# Patient Record
Sex: MALE | Race: WHITE
[De-identification: names, ages, dates, MRNs, and addresses within clinical notes are randomized per-mention and may not be internally consistent; named-entity substitution may affect disease eponyms.]

---

## 2018-03-12 ENCOUNTER — HOSPITAL ENCOUNTER (INPATIENT)
Dept: HOSPITAL 63 - ER | Age: 63
LOS: 1 days | Discharge: ANOTHER HOSPITAL | DRG: 308 | End: 2018-03-13
Attending: INTERNAL MEDICINE | Admitting: INTERNAL MEDICINE
Payer: COMMERCIAL

## 2018-03-12 VITALS — WEIGHT: 271.44 LBS | HEIGHT: 76 IN | BODY MASS INDEX: 33.05 KG/M2

## 2018-03-12 VITALS — SYSTOLIC BLOOD PRESSURE: 113 MMHG | DIASTOLIC BLOOD PRESSURE: 87 MMHG

## 2018-03-12 DIAGNOSIS — I50.23: ICD-10-CM

## 2018-03-12 DIAGNOSIS — Z79.01: ICD-10-CM

## 2018-03-12 DIAGNOSIS — Z87.891: ICD-10-CM

## 2018-03-12 DIAGNOSIS — Z23: ICD-10-CM

## 2018-03-12 DIAGNOSIS — Z79.899: ICD-10-CM

## 2018-03-12 DIAGNOSIS — Z88.0: ICD-10-CM

## 2018-03-12 DIAGNOSIS — I08.1: ICD-10-CM

## 2018-03-12 DIAGNOSIS — I42.9: ICD-10-CM

## 2018-03-12 DIAGNOSIS — I48.91: Primary | ICD-10-CM

## 2018-03-12 DIAGNOSIS — Z82.49: ICD-10-CM

## 2018-03-12 LAB
ALBUMIN SERPL-MCNC: 3.6 G/DL (ref 3.4–5)
ALBUMIN/GLOB SERPL: 1.1 {RATIO} (ref 1–1.7)
ALP SERPL-CCNC: 71 U/L (ref 46–116)
ALT SERPL-CCNC: 52 U/L (ref 16–63)
AMPHETAMINE/METHAMPHETAMINE: (no result)
ANION GAP SERPL CALC-SCNC: 10 MMOL/L (ref 6–14)
APTT PPP: YELLOW S
AST SERPL-CCNC: 32 U/L (ref 15–37)
BACTERIA #/AREA URNS HPF: 0 /HPF
BARBITURATES UR-MCNC: (no result) UG/ML
BASOPHILS # BLD AUTO: 0 X10^3/UL (ref 0–0.2)
BASOPHILS NFR BLD: 1 % (ref 0–3)
BENZODIAZ UR-MCNC: (no result) UG/L
BILIRUB SERPL-MCNC: 1 MG/DL (ref 0.2–1)
BILIRUB UR QL STRIP: (no result)
BUN/CREAT SERPL: 18 (ref 6–20)
CA-I SERPL ISE-MCNC: 16 MG/DL (ref 8–26)
CALCIUM SERPL-MCNC: 8.7 MG/DL (ref 8.5–10.1)
CANNABINOIDS UR-MCNC: (no result) UG/L
CHLORIDE SERPL-SCNC: 105 MMOL/L (ref 98–107)
CO2 SERPL-SCNC: 25 MMOL/L (ref 21–32)
COCAINE UR-MCNC: (no result) NG/ML
CREAT SERPL-MCNC: 0.9 MG/DL (ref 0.7–1.3)
EOSINOPHIL NFR BLD: 0.1 X10^3/UL (ref 0–0.7)
EOSINOPHIL NFR BLD: 2 % (ref 0–3)
ERYTHROCYTE [DISTWIDTH] IN BLOOD BY AUTOMATED COUNT: 15.6 % (ref 11.5–14.5)
FIBRINOGEN PPP-MCNC: CLEAR MG/DL
GFR SERPLBLD BASED ON 1.73 SQ M-ARVRAT: 86.1 ML/MIN
GLOBULIN SER-MCNC: 3.2 G/DL (ref 2.2–3.8)
GLUCOSE SERPL-MCNC: 104 MG/DL (ref 70–99)
GLUCOSE UR STRIP-MCNC: (no result) MG/DL
HCT VFR BLD CALC: 43.1 % (ref 39–53)
HGB BLD-MCNC: 14.3 G/DL (ref 13–17.5)
LIPASE: 320 U/L (ref 73–393)
LYMPHOCYTES # BLD: 1.6 X10^3/UL (ref 1–4.8)
LYMPHOCYTES NFR BLD AUTO: 23 % (ref 24–48)
MCH RBC QN AUTO: 30 PG (ref 25–35)
MCHC RBC AUTO-ENTMCNC: 33 G/DL (ref 31–37)
MCV RBC AUTO: 90 FL (ref 79–100)
METHADONE SERPL-MCNC: (no result) NG/ML
MONO #: 0.7 X10^3/UL (ref 0–1.1)
MONOCYTES NFR BLD: 11 % (ref 0–9)
NEUT #: 4.4 X10^3UL (ref 1.8–7.7)
NEUTROPHILS NFR BLD AUTO: 64 % (ref 31–73)
NITRITE UR QL STRIP: (no result)
OPIATES UR-MCNC: (no result) NG/ML
PCP SERPL-MCNC: (no result) MG/DL
PLATELET # BLD AUTO: 163 X10^3/UL (ref 140–400)
POTASSIUM SERPL-SCNC: 4 MMOL/L (ref 3.5–5.1)
PROT SERPL-MCNC: 6.8 G/DL (ref 6.4–8.2)
RBC # BLD AUTO: 4.78 X10^6/UL (ref 4.3–5.7)
RBC #/AREA URNS HPF: (no result) /HPF (ref 0–2)
SODIUM SERPL-SCNC: 140 MMOL/L (ref 136–145)
SP GR UR STRIP: 1.02
SQUAMOUS #/AREA URNS LPF: (no result) /LPF
UROBILINOGEN UR-MCNC: 0.2 MG/DL
WBC # BLD AUTO: 6.8 X10^3/UL (ref 4–11)
WBC #/AREA URNS HPF: (no result) /HPF (ref 0–4)

## 2018-03-12 PROCEDURE — 85027 COMPLETE CBC AUTOMATED: CPT

## 2018-03-12 PROCEDURE — 90686 IIV4 VACC NO PRSV 0.5 ML IM: CPT

## 2018-03-12 PROCEDURE — 84443 ASSAY THYROID STIM HORMONE: CPT

## 2018-03-12 PROCEDURE — 80307 DRUG TEST PRSMV CHEM ANLYZR: CPT

## 2018-03-12 PROCEDURE — 85730 THROMBOPLASTIN TIME PARTIAL: CPT

## 2018-03-12 PROCEDURE — 85025 COMPLETE CBC W/AUTO DIFF WBC: CPT

## 2018-03-12 PROCEDURE — 87040 BLOOD CULTURE FOR BACTERIA: CPT

## 2018-03-12 PROCEDURE — 83690 ASSAY OF LIPASE: CPT

## 2018-03-12 PROCEDURE — 84484 ASSAY OF TROPONIN QUANT: CPT

## 2018-03-12 PROCEDURE — 82550 ASSAY OF CK (CPK): CPT

## 2018-03-12 PROCEDURE — 85379 FIBRIN DEGRADATION QUANT: CPT

## 2018-03-12 PROCEDURE — 85610 PROTHROMBIN TIME: CPT

## 2018-03-12 PROCEDURE — G0479 DRUG TEST PRESUMP NOT OPT: HCPCS

## 2018-03-12 PROCEDURE — 83735 ASSAY OF MAGNESIUM: CPT

## 2018-03-12 PROCEDURE — 80053 COMPREHEN METABOLIC PANEL: CPT

## 2018-03-12 PROCEDURE — 93306 TTE W/DOPPLER COMPLETE: CPT

## 2018-03-12 PROCEDURE — 93005 ELECTROCARDIOGRAM TRACING: CPT

## 2018-03-12 PROCEDURE — 36415 COLL VENOUS BLD VENIPUNCTURE: CPT

## 2018-03-12 PROCEDURE — 83605 ASSAY OF LACTIC ACID: CPT

## 2018-03-12 PROCEDURE — 71275 CT ANGIOGRAPHY CHEST: CPT

## 2018-03-12 PROCEDURE — 81001 URINALYSIS AUTO W/SCOPE: CPT

## 2018-03-12 PROCEDURE — 87641 MR-STAPH DNA AMP PROBE: CPT

## 2018-03-12 PROCEDURE — 80048 BASIC METABOLIC PNL TOTAL CA: CPT

## 2018-03-12 PROCEDURE — 80061 LIPID PANEL: CPT

## 2018-03-12 PROCEDURE — 83880 ASSAY OF NATRIURETIC PEPTIDE: CPT

## 2018-03-12 PROCEDURE — 71045 X-RAY EXAM CHEST 1 VIEW: CPT

## 2018-03-12 RX ADMIN — SODIUM CHLORIDE SCH MLS/HR: 0.9 INJECTION, SOLUTION INTRAVENOUS at 22:02

## 2018-03-12 NOTE — RAD
Chest CTA

 

History: Tachycardia, elevated d-dimer, shortness of air

 

Technique: After bolus of intravenous contrast, CT imaging was performed 

of the chest. Multiplanar reconstruction images to include MIP 

reconstruction images are submitted.  Exposure: One or more of the 

following individualized dose reduction techniques were utilized for this 

examination:  1. Automated exposure control  2. Adjustment of the mA 

and/or kV according to patient size  3. Use of iterative reconstruction 

technique.

 

Contrast: 75 cc Omnipaque 300 

 

Comparison: None

 

Findings: [ ] The heart is enlarged. There is prominent coronary 

calcification. There is small dependent right pleural effusion. There is 

no pneumothorax. There is small quantity of free fluid about the 

visualized liver. There is mild reflux of contrast into the hepatic veins.

There is no pericardial effusion. Accurate evaluation of more distal and 

smaller pulmonary arteries is limited on this exam as not as well 

opacified with contrast, also some motion degradation. No central 

pulmonary embolism is identified. There are somewhat enlarged right hilar 

nodes, largest about 1.1 cm short axis dimension. There is subcarinal node

up to about 1.4 cm short axis dimension, some other smaller mediastinal 

nodes present. There are left hilar nodes, largest about 0.9 cm short 

axis. There is nonspecific sclerotic lesion T4 vertebral body.

 

Impression:

1. Heart is enlarged and there is prominent coronary calcification. There 

is also small right pleural effusion and mild free fluid of the visualized

abdomen in the right upper quadrant. Left ventricular failure is a 

consideration.

2. No central pulmonary embolism is identified, limited evaluation of the 

smaller and more distal branches on this exam.

3. There is nonspecific mediastinal and hilar lymphadenopathy.

4. There is nonspecific sclerotic lesion T4 vertebral body, possibly bone 

island although other marrow replacing lesion not excludable.

 

Electronically signed by: Matthew Mccain MD (3/12/2018 7:47 PM) [facility]

## 2018-03-12 NOTE — EKG
[facility] [address], [place] [address]

Test Date:    2018               Test Time:    17:10:53

Pat Name:     CADEN CUNNINGHAM        Department:   

Patient ID:   [id number]           Room:          

Gender:       M                        Technician:   GILBERTO

:          1955               Requested By: ANIL HORTON

Order Number: [id number]            Reading MD:     

                                 Measurements

Intervals                              Axis          

Rate:         137                      P:            

IN:                                    QRS:          17

QRSD:         92                       T:            66

QT:           314                                    

QTc:          476                                    

                           Interpretive Statements

IRREGULAR RHYTHM, NO P-WAVE FOUND

OTHERWISE NORMAL ECG

RI6.01

No previous ECG available for comparison

## 2018-03-12 NOTE — PHYS DOC
General


Chief Complaint:  RAPID HEART RATE


Stated Complaint:  shortness of breath


Time Seen by MD:  17:03


Source:  patient, RN/MD


Exam Limitations:  no limitations


Problems:   (ANIL HORTON DO)


Time Seen by MD:  18:40


Problems:   (XOCHILT BOSWELL MD)


History of Present Illness


Initial Comments


62-year-old male sent downstairs from PCPs office for rapid heart rate and 

dyspnea.





Patient states that he's had dyspnea with exertion and at times at rest 

intermittently for the past month. He denies any actual chest pain diaphoresis 

nausea arm or neck symptoms. Patient denies any ongoing medical problems he 

takes no medications daily is a nonsmoker and nonuser of alcohol or illicit 

substances. Through the discussion patient does state that his father had 

atrial fibrillation he is familiar with rate control and anticoagulation.





Margaret Jackson called down to the emergency department and spoke to me, states 

that this 8-year-old male presented to her office complaining of shortness of 

breath for the past month. States that he denied any chest pain or syncope, his 

heart rate was found to range from 132-150 bpm oxygen saturation 97% on room 

air and blood pressure 90/60. She is getting together a full H&P and we'll send 

it down with the patient, she reports that he takes no medications daily.


Timing/Duration:  other (one month)


Severity:  moderate


Modifying Factors:  worse with movement, improves with rest


Associated Symptoms:  shortness of breath


 (ANIL HORTON DO)


Allergies:  


Coded Allergies:  


     No Known Drug Allergies (Unverified , 3/12/18)





Past Medical History


Medical History:  no pertinent history


Surgical History:  no surgical history


 (ANIL HORTON DO)





Social History


Smoker:  non-smoker


Alcohol:  none


Drugs:  none


 (ANIL HORTON DO)





Review of Systems


Constitutional:  denies chills, denies diaphoresis, denies fever, malaise


Respiratory:  denies cough, shortness of breath, denies wheezing


Cardiovascular:  denies chest pain, denies palpitations, denies syncope


Gastrointestinal:  denies abdominal pain, denies nausea, denies vomiting


Musculoskeletal:  denies back pain, denies joint swelling, denies muscle pain, 

denies neck pain


Psychiatric/Neurological:  denies headache, denies numbness, denies paresthesia


Hematologic/Lymphatic:  denies blood clots, denies easy bleeding, denies easy 

bruising (ANIL HORTON DO)





Physical Exam


General Appearance:  WD/WN, no apparent distress


Eyes:  bilateral eye normal inspection, bilateral eye PERRL, bilateral eye EOMI


Ear, Nose, Throat:  hearing grossly normal, normal ENT inspection, normal 

pharynx


Neck:  non-tender, supple


Respiratory:  normal breath sounds, no respiratory distress


Cardiovascular:  normal peripheral pulses, tachycardia, irregularly irregular


Gastrointestinal:  normal bowel sounds, soft


Back:  no CVA tenderness, no vertebral tenderness


Extremities:  normal range of motion, non-tender, normal inspection, no pedal 

edema, no calf tenderness


Neurologic/Psychiatric:  CNs II-XII nml as tested, no motor/sensory deficits, 

alert, normal mood/affect, oriented x 3


Skin:  normal color, warm/dry


 (ANIL HORTON DO)





Orders, Labs, Meds


EKG: Atrial fibrillation with rapid ventricular response 137 bpm interpreted by 

me





1814: Multiple studies are pending, patient signed out to Dr. Boswell at shift 

change. See his documentation for results and patient disposition.


 (ANIL HORTON DO)


Orders, Labs, Meds





 Laboratory Tests








Test


  3/12/18


17:45


 


White Blood Count


  6.8 x10^3/uL


(4.0-11.0)


 


Red Blood Count


  4.78 x10^6/uL


(4.30-5.70)


 


Hemoglobin


  14.3 g/dL


(13.0-17.5)


 


Hematocrit


  43.1 %


(39.0-53.0)


 


Mean Corpuscular Volume


  90 fL ()


 


 


Mean Corpuscular Hemoglobin 30 pg (25-35)  


 


Mean Corpuscular Hemoglobin


Concent 33 g/dL


(31-37)


 


Red Cell Distribution Width


  15.6 %


(11.5-14.5)  H


 


Platelet Count


  163 x10^3/uL


(140-400)


 


Neutrophils (%) (Auto) 64 % (31-73)  


 


Lymphocytes (%) (Auto) 23 % (24-48)  L


 


Monocytes (%) (Auto) 11 % (0-9)  H


 


Eosinophils (%) (Auto) 2 % (0-3)  


 


Basophils (%) (Auto) 1 % (0-3)  


 


Neutrophils # (Auto)


  4.4 x10^3uL


(1.8-7.7)


 


Lymphocytes # (Auto)


  1.6 x10^3/uL


(1.0-4.8)


 


Monocytes # (Auto)


  0.7 x10^3/uL


(0.0-1.1)


 


Eosinophils # (Auto)


  0.1 x10^3/uL


(0.0-0.7)


 


Basophils # (Auto)


  0.0 x10^3/uL


(0.0-0.2)


 


Sodium Level


  140 mmol/L


(136-145)


 


Potassium Level


  4.0 mmol/L


(3.5-5.1)


 


Chloride Level


  105 mmol/L


()


 


Carbon Dioxide Level


  25 mmol/L


(21-32)


 


Anion Gap 10 (6-14)  


 


Blood Urea Nitrogen


  16 mg/dL


(8-26)


 


Creatinine


  0.9 mg/dL


(0.7-1.3)


 


Estimated GFR


(Cockcroft-Gault) 86.1  


 


 


BUN/Creatinine Ratio 18 (6-20)  


 


Glucose Level


  104 mg/dL


(70-99)  H


 


Lactic Acid Level


  1.0 mmol/L


(0.4-2.0)


 


Calcium Level


  8.7 mg/dL


(8.5-10.1)


 


Total Bilirubin


  1.0 mg/dL


(0.2-1.0)


 


Aspartate Amino Transferase


(AST) 32 U/L (15-37)


 


 


Alanine Aminotransferase (ALT)


  52 U/L (16-63)


 


 


Alkaline Phosphatase


  71 U/L


()


 


Creatine Kinase


  145 U/L


()


 


Troponin I Quantitative


  0.025 ng/mL


(0-0.055)


 


NT-Pro-B-Type Natriuretic


Peptide 3527 pg/mL


(0-124)  H


 


Total Protein


  6.8 g/dL


(6.4-8.2)


 


Albumin


  3.6 g/dL


(3.4-5.0)


 


Albumin/Globulin Ratio 1.1 (1.0-1.7)  


 


Lipase


  320 U/L


()





 [facility] [address], [place] [address] [phone]


 


 IMAGING REPORT





 Signed





PATIENT: CADEN CUNNINGHAM ACCOUNT: [id number] MRN#: [id number]


: 1955 LOCATION: ER AGE: 62


SEX: M EXAM DT: 18 ACCESSION#: [id number]


STATUS: REG ER ORD. PHYSICIAN: ANIL HORTON DO 


REASON: sob


PROCEDURE: PORTABLE CHEST 1V





PORTABLE CHEST 1V-2 AP images were obtained.


 


History: SOA.


 


Comparison: None are available


 


Findings:


Cardiomediastinal silhouette is enlarged.


No focal airspace consolidation.


No pneumothorax identified.


No evidence of pleural effusion.


 


 


Impression:  Enlargement of the cardiac silhouette. No evidence of acute 


infiltrate. 


 


Electronically signed by: Mitchell Reyes MD (3/12/2018 5:34 PM) [facility]














DICTATED AND SIGNED BY:     MITCHELL REYES MD


DATE:     18





CC: SPEEDY KUMAR; ANIL HORTON  ~








Is a pleasant 8-year-old male with no major medical problems who presents with 

irregular heartbeat and shortness of breath. On initial presentation it looks 

like he is in A. fib and RVR with no history of PE or risk factors for PE he 

seconds several risk for PE because of untreated H her for evaluation. Patient 

is not tachypneic hypoxic but is very tachycardic. He was given digoxin prior 

to my arrival and I encouraged Dr. peoples to give him a dose of verapamil 10 mg 

IV. He is still tachycardic rate 110.





Time is now 7:30 patient's heart rate is improved significantly with the 

verapamil IV push over 2 minutes. His heart rate is now below 90. He is not 

short of breath and having chest pain but he did have an elevated d-dimer noted 

upon initial presentation of 2.28 which will require further definitive 

definitive testing because of his atrial fibrillation he is at increased risk 

for PE.








CT angios the chest return at approximately 8 PM demonstrates no specific 

finding for PE he does have evidence of possible early congestive heart failure 

with significant calcification of the aorta and a small pleural effusion.





At this point I discussed factors and admission with the patient at the 

bedside. Because of his new onset atrial fibrillation I will also treated with 

a dose of Lovenox to protect him from the development of clots. It looks like 

he is rate controlled with a heart rate less than 90 I will repeat an EKG and 

admitted to the hospital to see cardiology and repeat serial enzymes to ensure 

that he is not suffering from cardiac ischemia.





Consultant note: On-call physician Dr. Lassiter





Consultant called at of the service service called at approximately 8:02 PM


Consult called back at 8:02 PM





Discussed the case I presented and they agreed with admission. Time of 

acceptance 8:02 PM





"I have assessed this patient clinically and believe that their condition 

requires an admission to the hospital.  After consulting the admitting 

physician about this case, they have asked that I admit this patient to their 

service as an inpatient based on the clinical presentation and my impression."





I spent approximately 45-50 minutes working and engaged directly in the patient 

care providing critical care evaluation this includes but not limited to time 

spent engaged in work directly related to the individual patients care. I spent 

time at the bedside, reviewing test results, discussing the case with staff, 

documenting the medical record and time spent with EMS discussing specific 

treatment issues when the patient presented and during his evaluation.





Differential diagnosis: Acute myocardial ischemia, heart failure, cardiac 

tamponade, bronchospasm, pulmonary embolism, pneumothorax, pulmonary infection 

i.e. bronchitis or pneumonia, upper airway obstruction, anaphylaxis, aspiration

, psychogenic, pulmonary contusion, toxidrome, pneumomediastinum, 

noncardiogenic pulmonary edema or ARDS, COPD, tuberculosis, cystic fibrosis, 

asthma, high altitude pulmonary edema, valvular dysfunction, cardiac dysrhythmia

, stroke, neuromuscular diseases like myasthenia gravis gravis, ALS, Guillain-

Barr syndrome, metabolic acidosis to include diabetic ketoacidosis, sepsis, 

and obstructive disorders like massive obesity








Impression: Early congestive heart failure, dilated cardio myopathy, atrial 

fibrillation now rate controlled,





Disposition: Admission to the hospital as an inpatient


 (XOCHILT BOSWELL MD)











ANIL HORTON DO Mar 12, 2018 17:05


XOCHILT BOSWELL MD Mar 12, 2018 18:47

## 2018-03-12 NOTE — RAD
PORTABLE CHEST 1V-2 AP images were obtained.

 

History: SOA.

 

Comparison: None are available

 

Findings:

Cardiomediastinal silhouette is enlarged.

No focal airspace consolidation.

No pneumothorax identified.

No evidence of pleural effusion.

 

 

Impression:  Enlargement of the cardiac silhouette. No evidence of acute 

infiltrate. 

 

Electronically signed by: Mitchell Reyes MD (3/12/2018 5:34 PM) [facility]

## 2018-03-13 VITALS — SYSTOLIC BLOOD PRESSURE: 101 MMHG | DIASTOLIC BLOOD PRESSURE: 88 MMHG

## 2018-03-13 VITALS — SYSTOLIC BLOOD PRESSURE: 104 MMHG | DIASTOLIC BLOOD PRESSURE: 73 MMHG

## 2018-03-13 VITALS
DIASTOLIC BLOOD PRESSURE: 84 MMHG | DIASTOLIC BLOOD PRESSURE: 84 MMHG | SYSTOLIC BLOOD PRESSURE: 107 MMHG | SYSTOLIC BLOOD PRESSURE: 107 MMHG

## 2018-03-13 VITALS — SYSTOLIC BLOOD PRESSURE: 104 MMHG | DIASTOLIC BLOOD PRESSURE: 79 MMHG

## 2018-03-13 VITALS — DIASTOLIC BLOOD PRESSURE: 91 MMHG | SYSTOLIC BLOOD PRESSURE: 111 MMHG

## 2018-03-13 VITALS — DIASTOLIC BLOOD PRESSURE: 79 MMHG | SYSTOLIC BLOOD PRESSURE: 113 MMHG

## 2018-03-13 LAB
ANION GAP SERPL CALC-SCNC: 6 MMOL/L (ref 6–14)
CA-I SERPL ISE-MCNC: 14 MG/DL (ref 8–26)
CALCIUM SERPL-MCNC: 8.3 MG/DL (ref 8.5–10.1)
CHLORIDE SERPL-SCNC: 106 MMOL/L (ref 98–107)
CHOLEST SERPL-MCNC: 93 MG/DL (ref 0–200)
CHOLEST/HDLC SERPL: 3 {RATIO}
CO2 SERPL-SCNC: 27 MMOL/L (ref 21–32)
CREAT SERPL-MCNC: 1 MG/DL (ref 0.7–1.3)
ERYTHROCYTE [DISTWIDTH] IN BLOOD BY AUTOMATED COUNT: 15.5 % (ref 11.5–14.5)
GFR SERPLBLD BASED ON 1.73 SQ M-ARVRAT: 76.2 ML/MIN
GLUCOSE SERPL-MCNC: 98 MG/DL (ref 70–99)
HCT VFR BLD CALC: 41 % (ref 39–53)
HDLC SERPL-MCNC: 25 MG/DL (ref 40–60)
HGB BLD-MCNC: 13.5 G/DL (ref 13–17.5)
LDLC: 61 MG/DL (ref 0–100)
MAGNESIUM SERPL-MCNC: 1.8 MG/DL (ref 1.8–2.4)
MCH RBC QN AUTO: 30 PG (ref 25–35)
MCHC RBC AUTO-ENTMCNC: 33 G/DL (ref 31–37)
MCV RBC AUTO: 90 FL (ref 79–100)
PLATELET # BLD AUTO: 148 X10^3/UL (ref 140–400)
POTASSIUM SERPL-SCNC: 3.7 MMOL/L (ref 3.5–5.1)
RBC # BLD AUTO: 4.55 X10^6/UL (ref 4.3–5.7)
SODIUM SERPL-SCNC: 139 MMOL/L (ref 136–145)
THYROID STIM HORMONE (TSH): 2.83 UIU/ML (ref 0.36–3.74)
TRIGL SERPL-MCNC: 37 MG/DL (ref 0–150)
VLDLC: 7 MG/DL (ref 0–40)
WBC # BLD AUTO: 6.3 X10^3/UL (ref 4–11)

## 2018-03-13 RX ADMIN — SODIUM CHLORIDE SCH MLS/HR: 0.9 INJECTION, SOLUTION INTRAVENOUS at 08:37

## 2018-03-13 RX ADMIN — APIXABAN SCH MG: 5 TABLET, FILM COATED ORAL at 12:28

## 2018-03-13 RX ADMIN — APIXABAN SCH MG: 5 TABLET, FILM COATED ORAL at 10:06

## 2018-03-13 NOTE — CARD
MR#: [id number]

Account#: [id number]

Accession#: [id number]

Date of Study: 03/13/2018

Ordering Physician: JUANITA RANDHAWA, 

Referring Physician: SHAHLA FAGAN 

Tech: NABEEL Tovar





--------------- APPROVED REPORT --------------





EXAM: Two-dimensional and M-mode echocardiogram with Doppler and color Doppler.



INDICATION

Dyspnea 

Atrial Fibrillation



2D DIMENSIONS 

Left Atrium(2D)5.6 (1.6-4.0cm)IVSd1.2 (0.7-1.1cm)

Aortic Root(2D)3.0 (2.0-3.7cm)LVDd7.8 (3.9-5.9cm)

LVOT Diameter2.4 (1.8-2.4cm)PWd1.3 (0.7-1.1cm)

LVDs7.1 (2.5-4.0cm)FS (%) 4.0 %

SV6.0 ml



Aortic Valve

LVOT Peak Miguel.47.4cm/s



Tricuspid Valve

TR P. [id number]/sRAP [id number]

TR Peak Gr.[id number]



 LEFT VENTRICLE 

The Left Ventricle is moderately dilated. There is mild concentric left ventricular hypertrophy. The 
ejection fraction is severely impaired. The Ejection Fraction is 15-20%. There is severe global hypok
inesis of the left ventricle. No left ventricle thrombus noted on this study.



 RIGHT VENTRICLE 

The right ventricle is mildly dilated. The right ventricle is borderline hypertrophied. Systolic func
tion is mildly reduced.



 ATRIA 

The left atrium is moderately dilated. The right atrium is moderately dilated. The interatrial septum
 is intact with no evidence for an atrial septal defect or patent foramen ovale as noted on 2-D or Do
ppler imaging.



 AORTIC VALVE 

The aortic valve is trileaflet. Doppler and Color Flow revealed trace aortic regurgitation. There is 
no significant aortic valvular stenosis.



 MITRAL VALVE 

The mitral valve leaflets are mildly calcified. There is no mitral valve stenosis. Doppler and Color-
flow revealed moderate to moderate severe mitral regurgitation.



 TRICUSPID VALVE 

Doppler and Color Flow revealed moderate to moderately severe tricuspid regurgitation. There is moder
ate pulmonary hypertension. PASP is 51mmHg. There is no tricuspid valve stenosis. 



 PULMONIC VALVE 

The pulmonic valve is not well visualized. Doppler and Color Flow revealed no pulmonic valvular regur
gitation. There is no pulmonic valvular stenosis.



 GREAT VESSELS 

The aortic root is normal in size. The pulmonary artery and branches are dilated. The IVC is severley
 dilated. There is no collapse of the IVC with inspiration.



 PERICARDIAL EFFUSION 

There is no pleural effusion. There is no evidence of significant pericardial effusion.



Critical Notification

Physician Notified 

Critical Value: Yes



<Conclusion>

The Left Ventricle is moderately  dilated.

The ejection fraction is severely impaired.

The Ejection Fraction is 15-20%.

There is severe global hypokinesis of the left ventricle.

There is mild concentric left ventricular hypertrophy.

There is no significant aortic valvular stenosis.

Doppler and Color Flow revealed trace aortic regurgitation.

Doppler and Color-flow revealed moderate to moderate severe  mitral regurgitation.

Doppler and Color Flow revealed moderate to moderately severe tricuspid regurgitation.

There is moderate pulmonary hypertension.  PASP is 51mmHg.



Signed by : Wayne Britt MD

Electronically Approved : 03/13/2018 12:56:18

## 2018-03-13 NOTE — PDOC2
JUANITA RANDHAWA APRN 3/13/18 0936:


CONSULT


Date of Admission


DATE: 3/13/18 


TIME: 09:28


Reason for Consult:


atrial fibrillation with RVR


Problem List


Problems


Medical Problems:


(1) New onset atrial fibrillation


Status: Acute  








History of Present Illness


Mr Vasquez is a 62 year old male who presented to his PCP yesterday for 

evaluation of dyspnea on exertion that he has been experiencing for the last 

couple weeks.  He denies chest pain, congestive symptoms, edema, palpitations, 

lightheadedness or syncope.  He denies any cough, fever or chills.  He denies 

any recent travel or sick exposures.


Past Medical History


no significant medical history


Past Surgical History


no significant history


Family History


Father with atrial fibrillation


Social History


remote smoking history, no significant ETOh, no illicit drugs


Current Medications





Current Medications


Aspirin (Children'S Aspirin) 324 mg 1X  ONCE PO  Last administered on 3/12/18at 

17:33;  Start 3/12/18 at 17:30;  Stop 3/12/18 at 17:31;  Status DC


Sodium Chloride 1,000 ml @  1,000 mls/hr Q1H IV  Last administered on 3/12/18at 

17:43;  Start 3/12/18 at 17:05;  Stop 3/12/18 at 18:04;  Status DC


Digoxin (Lanoxin) 500 mcg 1X  ONCE IV  Last administered on 3/12/18at 17:49;  

Start 3/12/18 at 18:00;  Stop 3/12/18 at 18:03;  Status DC


Verapamil HCl (Verapamil HCl) 10 mg 1X  ONCE IV  Last administered on 3/12/18at 

18:59;  Start 3/12/18 at 18:30;  Stop 3/12/18 at 18:31;  Status DC


Verapamil HCl (Verapamil HCl) 10 mg 1X  ONCE IV ;  Start 3/12/18 at 19:00;  

Stop 3/12/18 at 19:01;  Status DC


Iohexol (Omnipaque 300 Mg/ml) 75 ml 1X  ONCE IV  Last administered on 3/12/18at 

19:19;  Start 3/12/18 at 19:15;  Stop 3/12/18 at 19:16;  Status DC


Info (Do NOT chart on this entry -- for MONITORING) 1 each PRN DAILY  PRN MC 

SEE COMMENTS;  Start 3/12/18 at 19:15;  Stop 3/14/18 at 19:14


Ondansetron HCl (Zofran) 4 mg PRN Q4HRS  PRN IV NAUSEA/VOMITING;  Start 3/12/18 

at 20:15;  Stop 3/13/18 at 20:14


Sodium Chloride 1,000 ml @  80 mls/hr [id number] IV  Last administered on 3/12/

18at 22:02;  Start 3/12/18 at 20:07;  Stop 3/13/18 at 20:06


Enoxaparin Sodium (Lovenox 120mg Syringe) 120 mg 1X  ONCE SQ  Last administered 

on 3/12/18at 22:01;  Start 3/12/18 at 20:15;  Stop 3/12/18 at 20:16;  Status DC


Influenza Virus Vaccine Quadrival (Fluarix Quad [id number] Syringe) 0.5 ml ONCE 

ONCE VAX IM  Last administered on 3/13/18at 09:01;  Start 3/13/18 at 09:00;  

Stop 3/13/18 at 09:01;  Status DC





Active Scripts


Active


Reported


No Known Medications Prior To Admisstion (Info)  Each 1 Each 


Allergies:  


Coded Allergies:  


     Penicillins (Verified  Allergy, Unknown, Unknown, 3/12/18)


Review of System


as per HPI or negative


General:  Alert, Oriented X3, Cooperative, No acute distress


HEENT:  Atraumatic, EOMI, Mucous membr. moist/pink


Lungs:  Clear to auscultation, Normal air movement


Heart:  Other (irregular rate and rhythm, no gallops, clicks or rubs, no 

significant murmurs)


Abdomen:  Normal bowel sounds, Soft, No tenderness


Extremities:  No cyanosis, No edema, Normal pulses


Neuro:  Normal speech, Strength at 5/5 X4 ext, Cranial nerves 3-12 NL


Psych/Mental Status:  Mental status NL, Mood NL


VITALS





Vital Signs








  Date Time  Temp Pulse Resp B/P (MAP) Pulse Ox O2 Delivery O2 Flow Rate FiO2


 


3/13/18 07:11  101 20 113/79 (90) 96 Room Air  


 


3/12/18 21:52 97.9       








Labs





Laboratory Tests








Test


  3/12/18


17:45 3/12/18


19:35 3/12/18


23:15 3/13/18


03:30


 


White Blood Count


  6.8 x10^3/uL


(4.0-11.0) 


  


  6.3 x10^3/uL


(4.0-11.0)


 


Red Blood Count


  4.78 x10^6/uL


(4.30-5.70) 


  


  4.55 x10^6/uL


(4.30-5.70)


 


Hemoglobin


  14.3 g/dL


(13.0-17.5) 


  


  13.5 g/dL


(13.0-17.5)


 


Hematocrit


  43.1 %


(39.0-53.0) 


  


  41.0 %


(39.0-53.0)


 


Mean Corpuscular Volume 90 fL ()    90 fL () 


 


Mean Corpuscular Hemoglobin 30 pg (25-35)    30 pg (25-35) 


 


Mean Corpuscular Hemoglobin


Concent 33 g/dL


(31-37) 


  


  33 g/dL


(31-37)


 


Red Cell Distribution Width


  15.6 %


(11.5-14.5) 


  


  15.5 %


(11.5-14.5)


 


Platelet Count


  163 x10^3/uL


(140-400) 


  


  148 x10^3/uL


(140-400)


 


Neutrophils (%) (Auto) 64 % (31-73)    


 


Lymphocytes (%) (Auto) 23 % (24-48)    


 


Monocytes (%) (Auto) 11 % (0-9)    


 


Eosinophils (%) (Auto) 2 % (0-3)    


 


Basophils (%) (Auto) 1 % (0-3)    


 


Neutrophils # (Auto)


  4.4 x10^3uL


(1.8-7.7) 


  


  


 


 


Lymphocytes # (Auto)


  1.6 x10^3/uL


(1.0-4.8) 


  


  


 


 


Monocytes # (Auto)


  0.7 x10^3/uL


(0.0-1.1) 


  


  


 


 


Eosinophils # (Auto)


  0.1 x10^3/uL


(0.0-0.7) 


  


  


 


 


Basophils # (Auto)


  0.0 x10^3/uL


(0.0-0.2) 


  


  


 


 


Prothrombin Time


  12.5 SEC


(9.4-11.4) 


  


  


 


 


Prothromb Time International


Ratio 1.2 (0.9-1.1) 


  


  


  


 


 


Activated Partial


Thromboplast Time 24 SEC (23-33) 


  


  


  


 


 


D-Dimer (Cristina)


  2.28 mg/L


(0.00-0.50) 


  


  


 


 


Sodium Level


  140 mmol/L


(136-145) 


  


  139 mmol/L


(136-145)


 


Potassium Level


  4.0 mmol/L


(3.5-5.1) 


  


  3.7 mmol/L


(3.5-5.1)


 


Chloride Level


  105 mmol/L


() 


  


  106 mmol/L


()


 


Carbon Dioxide Level


  25 mmol/L


(21-32) 


  


  27 mmol/L


(21-32)


 


Anion Gap 10 (6-14)    6 (6-14) 


 


Blood Urea Nitrogen


  16 mg/dL


(8-26) 


  


  14 mg/dL


(8-26)


 


Creatinine


  0.9 mg/dL


(0.7-1.3) 


  


  1.0 mg/dL


(0.7-1.3)


 


Estimated GFR


(Cockcroft-Gault) 86.1 


  


  


  76.2 


 


 


BUN/Creatinine Ratio 18 (6-20)    


 


Glucose Level


  104 mg/dL


(70-99) 


  


  98 mg/dL


(70-99)


 


Lactic Acid Level


  1.0 mmol/L


(0.4-2.0) 


  


  


 


 


Calcium Level


  8.7 mg/dL


(8.5-10.1) 


  


  8.3 mg/dL


(8.5-10.1)


 


Total Bilirubin


  1.0 mg/dL


(0.2-1.0) 


  


  


 


 


Aspartate Amino Transf


(AST/SGOT) 32 U/L (15-37) 


  


  


  


 


 


Alanine Aminotransferase


(ALT/SGPT) 52 U/L (16-63) 


  


  


  


 


 


Alkaline Phosphatase


  71 U/L


() 


  


  


 


 


Creatine Kinase


  145 U/L


() 


  


  


 


 


Troponin I Quantitative


  0.025 ng/mL


(0-0.055) 


  0.030 ng/mL


(0-0.055) 0.021 ng/mL


(0-0.055)


 


NT-Pro-B-Type Natriuretic


Peptide 3527 pg/mL


(0-124) 


  


  


 


 


Total Protein


  6.8 g/dL


(6.4-8.2) 


  


  


 


 


Albumin


  3.6 g/dL


(3.4-5.0) 


  


  


 


 


Albumin/Globulin Ratio 1.1 (1.0-1.7)    


 


Lipase


  320 U/L


() 


  


  


 


 


Urine Collection Type  Unknown   


 


Urine Color  Yellow   


 


Urine Clarity  Clear   


 


Urine pH  5.0   


 


Urine Specific Gravity  1.025   


 


Urine Protein


  


  30 mg/dl


(NEG-TRACE) 


  


 


 


Urine Glucose (UA)


  


  Neg mg/dL


(NEG) 


  


 


 


Urine Ketones (Stick)


  


  Neg mg/dL


(NEG) 


  


 


 


Urine Blood  Neg (NEG)   


 


Urine Nitrite  Neg (NEG)   


 


Urine Bilirubin  Neg (NEG)   


 


Urine Urobilinogen Dipstick


  


  0.2 mg/dL (0.2


mg/dL) 


  


 


 


Urine Leukocyte Esterase  Neg (NEG)   


 


Urine RBC  Occ /HPF (0-2)   


 


Urine WBC  Occ /HPF (0-4)   


 


Urine Squamous Epithelial


Cells 


  Few /LPF 


  


  


 


 


Urine Bacteria  0 /HPF (0-FEW)   


 


Urine Mucus  Mod /LPF   


 


Urine Opiates Screen  Neg (NEG)   


 


Urine Methadone Screen  Neg (NEG)   


 


Urine Barbiturates  Neg (NEG)   


 


Urine Phencyclidine Screen  Neg (NEG)   


 


Urine


Amphetamine/Methamphetamine 


  Neg (NEG) 


  


  


 


 


Urine Benzodiazepines Screen  Neg (NEG)   


 


Urine Cocaine Screen  Neg (NEG)   


 


Urine Cannabinoids Screen  Neg (NEG)   


 


Urine Ethyl Alcohol  Neg (NEG)   


 


Magnesium Level


  


  


  


  1.8 mg/dL


(1.8-2.4)








Images


CXR - Impression:  Enlargement of the cardiac silhouette. No evidence of acute 


infiltrate. 





CT -  


Impression:


1. Heart is enlarged and there is prominent coronary calcification. There 


is also small right pleural effusion and mild free fluid of the visualized


abdomen in the right upper quadrant. Left ventricular failure is a 


consideration.


2. No central pulmonary embolism is identified, limited evaluation of the 


smaller and more distal branches on this exam.


3. There is nonspecific mediastinal and hilar lymphadenopathy.


4. There is nonspecific sclerotic lesion T4 vertebral body, possibly bone 


island although other marrow replacing lesion not excludable.


Assessment/Plan


1.  atrial fibrillation with RVR - Rate controlled after IV verapamil and 

digoxin.   Add oral metoprolol to maintain heart rate control.  [id number] = 

0.  As unknown duration of atrial fibrillation, suggest anticoagulation for 4 

weeks, outpatient MCT and then follow up to discuss ECV vs Antiarrhythmic.  

Should be able to stop anticoag in favor of aspirin after that. 


2.  dyspnea on exertion - no overt heart failure.  likely secondary to #1.  

Check echo and if normal, outpatient MPI.  


3.  unknown lipid status - check lipids.


Problems:  





DOTTIE RAMOS MD 3/13/18 1629:


CONSULT


Allergies:  


Coded Allergies:  


     Penicillins (Verified  Allergy, Unknown, Unknown, 3/12/18)


Assessment/Plan


Patient seen and examined


1. Probable new onset of atrial fibrillation. Rate initially elevated but now 

under better control. Will continue beta blockers and calcium blockers if 

needed for rate control. Would anticoagulate. We'll check an echocardiogram. 


2. Increasing dyspnea on exertion. Patient reports several weeks of increasing 

dyspnea. This occurs with exertion but not at rest. Continue medical treatment 

and rate control as above. We'll check an echocardiogram. Further treatment 

based on clinical course and results of echocardiogram.


3. Uncertain lipid levels. Check a panel.


Thank you for allowing us to participate in the care of your patient.


Problems:  











JUANITA RANDHAWA Mar 13, 2018 09:36


DOTTIE RAMOS MD Mar 13, 2018 16:29

## 2018-03-13 NOTE — SSS
ADMIT DATE:  2018



HISTORY OF PRESENT ILLNESS:  The patient is a 62-year-old  male patient

who apparently saw his primary care physician with a complaint of shortness of

breath and was found to be in atrial fibrillation with rapid ventricular

response.  He stated that his dyspnea with exertion and at times at rest

evidently has been going on for the last month.  He denies any actual chest

pain, diaphoresis, nausea, arm or neck symptoms.  Denies any ongoing medical

problems.  He takes no medication.



SOCIAL HISTORY:  He is a nonsmoker, does not use any alcohol or use any illicit

drugs.



On arrival to the Emergency Room, he was found to be in atrial fibrillation with

rapid ventricular response with the heart rate ranged between 130-150 beats per

minute.  However, his oxygen saturation was 97% on room air.  Blood pressure was

borderline low at 90/60.



PAST MEDICAL HISTORY:  Unremarkable in that he has never been diagnosed with any

medical problem.  No hypertension, diabetes, or hyperlipidemia.



PAST SURGICAL HISTORY:  Unremarkable.



ALLERGIES:  He is allergic to PENICILLIN.



MEDICATIONS:  Only over-the-counter medication, multivitamin.



FAMILY HISTORY:  His father  at the age of 52 because of myocardial

infarction and he was known to have atrial fibrillation and was on

anticoagulant.  His mother is still alive at the age of 83 and basically

healthy.



SOCIAL HISTORY:  He is , has 5 kids, 2 of them are stepsons.  He quit

smoking 30 years ago.  Does not drink alcohol or use any recreational drugs.  He

works as a cook.



PHYSICAL EXAMINATION:

GENERAL:  On examining him, the patient looked well and was clearly in no

apparent respiratory distress, slightly pale, but not jaundiced or cyanosed from

thyromegaly.  No jugular venous distension.  No lower limb edema.

VITAL SIGNS:  His heart rate was 121, irregularly irregular, blood pressure

114/74, temperature was 97.6, respiratory rate was 18, and oxygen saturation was

95%.

HEAD, EYES, EARS, NOSE, AND THROAT:  Normocephalic, atraumatic.

NECK:  Supple.

HEART:  Showed normal first and second heart sounds with no gallop, rub, or

murmur.

CHEST:  Showed central trachea, equal bilateral expansion and air entry,

vesicular breath sounds.  No crepitation or rhonchi.

ABDOMEN:  Distended, soft, nontender.  No guarding or rigidity.  No

organomegaly.  All hernial orifices intact.  Bowel sounds normal.

NEUROLOGIC:  He was awake, alert, responding appropriately.  Cranial nerves

intact.  He moves extremities without difficulty, ambulates without assistance

or assistive devices.



While in the Emergency Room, he has lab work, which showed a white cell count of

6800, hemoglobin 14, hematocrit 43, MCV 90, and platelet count of 163,000.  His

initial chemistry at the Emergency Room showed a serum sodium of 140, potassium

4, chloride 105, bicarbonate 25, anion gap of 10, BUN 16, creatinine 0.9,

estimated GFR was 86 mL per minute, her glucose was 104, calcium was 8.7.  Total

bilirubin, AST, ALT, alkaline phosphatase were normal.  His CK was 145. 

Troponin was 0.025.  His B-type natriuretic peptide was 3527.  Total protein was

6.8.  Albumin 3.6.  His serum lipase was 320.  His prothrombin time was 12.5,

INR 1.2, PTT was 24.  D-dimer was high 2.28 mg/dL.  Urinalysis was unremarkable.

 Toxic screen was essentially negative.  His chest x-ray showed that the patient

has enlarged cardiomediastinal silhouette.  There is no focal airspace

consolidation.  No pneumothorax.  No evidence of pleural effusion.  CT angio of

the chest showed that the heart is enlarged.  There is prominent coronary

calcification.  There is also small right pleural effusion and mild free fluid

of the visualized abdomen in the right upper quadrant.  Left ventricular failure

is consideration.



No central pulmonary embolism identified.  Limited evaluation of the smaller and

more distal branches on exam.  There is no specific mediastinal or hilar

lymphadenopathy.  There is nonspecific sclerotic lesion at T4 vertebral body,

possibly bone island, although other ____ lesion cannot be excluded.



The patient has had 2 more sets of cardiac enzymes which showed that troponin

has risen to 0.03 and 0.021.  He was seen by the Cardiology team and apparently

has had an echocardiogram done, which basically showed that his left ventricle

is moderately dilated.  The ejection fraction is severely impaired.  The

ejection fraction is only 15-20%.  There is severe global hypokinesis of the

left ventricle.  There is mild concentric left ventricular hypertrophy.  There

is no significant aortic valvular stenosis.  Doppler and color flow revealed

trace aortic regurgitation, moderate to moderately severe mitral regurgitation,

and moderate to moderately severe tricuspid regurgitation.  His pulmonary artery

systolic pressure is 51 mmHg.  Given the new finding, the Cardiology team

recommended transferring him to [facility] for cardiac

catheterization that is scheduled tomorrow morning.



FINAL DISCHARGE DIAGNOSES:

1.  New extent of atrial fibrillation.

2.  Systolic congestive heart failure, perhaps acute on chronic, moderate to

moderately severe mitral regurgitation, moderately severe tricuspid

regurgitation.  His serum TSH was normal at 2.826 and his fasting lipid profile

showed that his triglycerides were 57%, total cholesterol 93, LDL cholesterol

61, VLDL was 7, and HDL was 25 with the ratio of 3.  He will be discharged to

[facility] to continue on apixaban 5 mg twice a day, metoprolol

tartrate 25 mg twice a day, and Zofran 4 mg IV every 4 hours as needed.





______________________________

SHAHLA FAGAN MD



DR:  CARMELINA/rajani  JOB#:  [id number] / [id number]

DD:  2018 14:37  DT:  2018 15:51

## 2018-06-14 ENCOUNTER — HOSPITAL ENCOUNTER (OUTPATIENT)
Dept: HOSPITAL 61 - ECHO | Age: 63
Discharge: HOME | End: 2018-06-14
Attending: INTERNAL MEDICINE
Payer: COMMERCIAL

## 2018-06-14 DIAGNOSIS — I51.7: ICD-10-CM

## 2018-06-14 DIAGNOSIS — I34.0: ICD-10-CM

## 2018-06-14 DIAGNOSIS — I48.91: Primary | ICD-10-CM

## 2018-06-14 PROCEDURE — 93306 TTE W/DOPPLER COMPLETE: CPT

## 2018-07-05 ENCOUNTER — HOSPITAL ENCOUNTER (OUTPATIENT)
Dept: HOSPITAL 61 - ECHO | Age: 63
Discharge: HOME | End: 2018-07-05
Attending: INTERNAL MEDICINE
Payer: COMMERCIAL

## 2018-07-05 DIAGNOSIS — I34.0: Primary | ICD-10-CM

## 2018-07-05 DIAGNOSIS — Z79.899: ICD-10-CM

## 2018-07-05 DIAGNOSIS — I51.7: ICD-10-CM

## 2018-07-05 PROCEDURE — 93306 TTE W/DOPPLER COMPLETE: CPT

## 2018-08-01 ENCOUNTER — HOSPITAL ENCOUNTER (OUTPATIENT)
Dept: HOSPITAL 61 - SURG | Age: 63
Discharge: HOME | End: 2018-08-01
Attending: INTERNAL MEDICINE
Payer: COMMERCIAL

## 2018-08-01 VITALS
DIASTOLIC BLOOD PRESSURE: 65 MMHG | DIASTOLIC BLOOD PRESSURE: 65 MMHG | SYSTOLIC BLOOD PRESSURE: 115 MMHG | DIASTOLIC BLOOD PRESSURE: 65 MMHG | SYSTOLIC BLOOD PRESSURE: 115 MMHG | SYSTOLIC BLOOD PRESSURE: 115 MMHG

## 2018-08-01 DIAGNOSIS — I42.9: ICD-10-CM

## 2018-08-01 DIAGNOSIS — Z88.0: ICD-10-CM

## 2018-08-01 DIAGNOSIS — F17.200: ICD-10-CM

## 2018-08-01 DIAGNOSIS — I10: ICD-10-CM

## 2018-08-01 DIAGNOSIS — Z79.899: ICD-10-CM

## 2018-08-01 DIAGNOSIS — E78.00: ICD-10-CM

## 2018-08-01 DIAGNOSIS — I34.0: ICD-10-CM

## 2018-08-01 DIAGNOSIS — I25.10: ICD-10-CM

## 2018-08-01 DIAGNOSIS — I48.91: Primary | ICD-10-CM

## 2018-08-01 LAB
ANION GAP SERPL CALC-SCNC: 8 MMOL/L (ref 6–14)
BLOOD UREA NITROGEN: 14 MG/DL (ref 8–26)
CALCIUM: 9.2 MG/DL (ref 8.5–10.1)
CHLORIDE: 102 MMOL/L (ref 98–107)
CO2 SERPL-SCNC: 29 MMOL/L (ref 21–32)
CREAT SERPL-MCNC: 0.8 MG/DL (ref 0.7–1.3)
GFR SERPLBLD BASED ON 1.73 SQ M-ARVRAT: 98.3 ML/MIN
GLUCOSE SERPL-MCNC: 109 MG/DL (ref 70–99)
MAGNESIUM: 2 MG/DL (ref 1.8–2.4)
POTASSIUM SERPL-SCNC: 3.9 MMOL/L (ref 3.5–5.1)
SODIUM: 139 MMOL/L (ref 136–145)

## 2018-08-01 PROCEDURE — 93005 ELECTROCARDIOGRAM TRACING: CPT

## 2018-08-01 PROCEDURE — 83735 ASSAY OF MAGNESIUM: CPT

## 2018-08-01 PROCEDURE — 36415 COLL VENOUS BLD VENIPUNCTURE: CPT

## 2018-08-01 PROCEDURE — 80048 BASIC METABOLIC PNL TOTAL CA: CPT

## 2018-08-01 PROCEDURE — 92960 CARDIOVERSION ELECTRIC EXT: CPT

## 2018-08-01 RX ADMIN — SODIUM CHLORIDE, SODIUM LACTATE, POTASSIUM CHLORIDE, AND CALCIUM CHLORIDE 1 MLS/HR: .6; .31; .03; .02 INJECTION, SOLUTION INTRAVENOUS at 10:08

## 2018-09-10 ENCOUNTER — HOSPITAL ENCOUNTER (OUTPATIENT)
Dept: HOSPITAL 61 - LAB | Age: 63
Discharge: HOME | End: 2018-09-10
Attending: NURSE PRACTITIONER
Payer: COMMERCIAL

## 2018-09-10 DIAGNOSIS — I50.22: ICD-10-CM

## 2018-09-10 DIAGNOSIS — E78.00: ICD-10-CM

## 2018-09-10 DIAGNOSIS — Z82.49: ICD-10-CM

## 2018-09-10 DIAGNOSIS — Z87.891: ICD-10-CM

## 2018-09-10 DIAGNOSIS — Z88.0: ICD-10-CM

## 2018-09-10 DIAGNOSIS — I25.10: ICD-10-CM

## 2018-09-10 DIAGNOSIS — I11.0: Primary | ICD-10-CM

## 2018-09-10 LAB
ANION GAP SERPL CALC-SCNC: 8 MMOL/L (ref 6–14)
BUN SERPL-MCNC: 10 MG/DL (ref 8–26)
CALCIUM SERPL-MCNC: 9.3 MG/DL (ref 8.5–10.1)
CHLORIDE SERPL-SCNC: 103 MMOL/L (ref 98–107)
CO2 SERPL-SCNC: 28 MMOL/L (ref 21–32)
CREAT SERPL-MCNC: 0.8 MG/DL (ref 0.7–1.3)
GFR SERPLBLD BASED ON 1.73 SQ M-ARVRAT: 98.3 ML/MIN
GLUCOSE SERPL-MCNC: 88 MG/DL (ref 70–99)
POTASSIUM SERPL-SCNC: 4 MMOL/L (ref 3.5–5.1)
SODIUM SERPL-SCNC: 139 MMOL/L (ref 136–145)

## 2018-09-10 PROCEDURE — 36415 COLL VENOUS BLD VENIPUNCTURE: CPT

## 2018-09-10 PROCEDURE — 80048 BASIC METABOLIC PNL TOTAL CA: CPT

## 2018-10-08 ENCOUNTER — HOSPITAL ENCOUNTER (OUTPATIENT)
Dept: HOSPITAL 61 - ECHO | Age: 63
Discharge: HOME | End: 2018-10-08
Attending: NURSE PRACTITIONER
Payer: COMMERCIAL

## 2018-10-08 DIAGNOSIS — I50.22: ICD-10-CM

## 2018-10-08 DIAGNOSIS — I11.0: ICD-10-CM

## 2018-10-08 DIAGNOSIS — I48.91: Primary | ICD-10-CM

## 2018-10-08 PROCEDURE — 93306 TTE W/DOPPLER COMPLETE: CPT

## 2018-10-08 NOTE — CARD
MR#: [id number]

Account#: [id number]

Accession#: [id number]

Date of Study: 10/08/2018

Ordering Physician: JUANITA RANDHAWA, 

Referring Physician: JUANITA RANDHAWA, 

Tech: Jenny Gar JUAQUIN





--------------- APPROVED REPORT --------------





EXAM: Two-dimensional and M-mode echocardiogram with Doppler and color Doppler.



Other Information 

Quality : GoodHR: 85bpm

Rhythm : Atrial Fibrillation



INDICATION

Atrial fibrillation



2D DIMENSIONS 

RVDd2.8 (2.9-3.5cm)Left Atrium(2D)4.5 (1.6-4.0cm)

IVSd1.1 (0.7-1.1cm)Aortic Root(2D)3.2 (2.0-3.7cm)

LVDd6.4 (3.9-5.9cm)LVOT Diameter2.3 (1.8-2.4cm)

PWd1.2 (0.7-1.1cm)LVDs4.8 (2.5-4.0cm)

FS (%) 25.0 %SV99.4 ml

LVEF(%)48.5 (>50%)



Aortic Valve

AoV Peak Miguel.107.6cm/sAoV VTI19.3cm

AO Peak GR.6.5mmHgLVOT Peak Miguel.101.1cm/s

AO Mean GR.3mmHgAVA (VMAX)4.01cm2

DIANNE   (VTI)4.00cm2



Mitral Valve

MV E Jpzlywnj32.4cm/sMV E Peak Gr.2mmHg

MV DECEL [id number] A Myjhqgwm55.3cm/s

MV E Mean Gr.1mmHgE/A  Ratio3.7

MV A Qrmiwegj90tu



Pulmonary Valve

PV Peak Gydgauoj437.7cm/s



 LEFT VENTRICLE 

The Left Ventricle is mildly dilated. There is borderline concentric left ventricular hypertrophy. Th
e left ventricular systolic function is mildly impaired. The Ejection Fraction is 40-45%. There is gl
obal hypokinesis of the left ventricle.



 RIGHT VENTRICLE 

The right ventricle is mildly dilated. There is normal right ventricular wall thickness. The right ve
ntricular systolic function is normal.



 ATRIA 

The left atrium is mildly dilated. The right atrium is mildly dilated. The interatrial septum is inta
ct with no evidence for an atrial septal defect or patent foramen ovale as noted on 2-D or Doppler im
aging.



 AORTIC VALVE 

The aortic valve is mildly thickened but opens well. The aortic valve is trileaflet. Doppler and [place]
r Flow revealed no significant aortic regurgitation. There is no significant aortic valvular stenosis
.



 MITRAL VALVE 

The mitral valve is normal in structure and function. There is no evidence of mitral valve prolapse. 
There is no mitral valve stenosis. Doppler and Color-flow revealed trace mitral regurgitation.



 TRICUSPID VALVE 

The tricuspid valve is normal in structure and function. Doppler and Color Flow revealed no tricuspid
 valve regurgitation noted. There is no tricuspid valve prolapse or vegetation. There is no tricuspid
 valve stenosis.



 PULMONIC VALVE 

The pulmonary valve is normal in structure and function. Doppler and Color Flow revealed no pulmonic 
valvular regurgitation. There is no pulmonic valvular stenosis.



 GREAT VESSELS 

The aortic root is normal in size. The ascending aorta is normal in size.



 PERICARDIAL EFFUSION 

There is no evidence of significant pericardial effusion.



Critical Notification

Critical Value: No



<Conclusion>

The left ventricular systolic function is mildly impaired.

The Ejection Fraction is 40-45%.

The left atrium is mildly dilated.

Doppler and Color-flow revealed trace mitral regurgitation.

There is no evidence of significant pericardial effusion.



Signed by : Matt Ramirez, 

Electronically Approved : 10/08/2018 09:35:31

## 2019-04-16 ENCOUNTER — HOSPITAL ENCOUNTER (OUTPATIENT)
Dept: HOSPITAL 61 - ECHO | Age: 64
Discharge: HOME | End: 2019-04-16
Attending: INTERNAL MEDICINE
Payer: COMMERCIAL

## 2019-04-16 DIAGNOSIS — I25.10: Primary | ICD-10-CM

## 2019-04-16 DIAGNOSIS — Z87.891: ICD-10-CM

## 2019-04-16 DIAGNOSIS — I48.91: ICD-10-CM

## 2019-04-16 PROCEDURE — 93306 TTE W/DOPPLER COMPLETE: CPT

## 2019-04-16 NOTE — CARD
MR#: [id number]

Account#: [id number]

Accession#: [id number]

Date of Study: 04/16/2019

Ordering Physician: DOTTIE RAMOS, 

Referring Physician: DOTTIE RAMOS, 

Tech: Marbella Jarrett





--------------- APPROVED REPORT --------------





EXAM: Two-dimensional and M-mode echocardiogram with Doppler and color Doppler.



Other Information 

Quality : AverageHR: 94bpm



INDICATION

Atrial Fibrillation

CAD 



2D DIMENSIONS 

RVDd2.3 (2.9-3.5cm)Left Atrium(2D)3.9 (1.6-4.0cm)

IVSd1.0 (0.7-1.1cm)Aortic Root(2D)3.5 (2.0-3.7cm)

LVDd5.2 (3.9-5.9cm)LVOT Diameter2.3 (1.8-2.4cm)

PWd1.2 (0.7-1.1cm)LVDs4.0 (2.5-4.0cm)

FS (%) 24.4 %SV63.5 ml

LVEF(%)48.1 (>50%)



Aortic Valve

AoV Peak Miguel.130.4cm/sAoV VTI20.2cm

AO Peak GR.6.8mmHgLVOT Peak Miguel.99.7cm/s

LVOT  VTI 17.34cmAO Mean GR.4mmHg

DIANNE (VMAX)2.45xl9EWO   (VTI)3.71cm2



Mitral Valve

MV E Tusngvpy40.4cm/sMV DECEL [id number]

MV A Bhlbrthv29.0cm/sMV ONB247ms

E/A  Ratio2.2MVA (PHT)2.11cm2



TDI

E/Lateral E'6.1E/Medial E'4.6



Pulmonary Valve

PV Peak Fzwnbevu548.9cm/sPV Peak Grad.6mmHg



Tricuspid Valve

TR P. Ttsskjqu395ft/sRAP BWCGEQNF2pfXx

TR Peak Gr.20zxWrPVJJ66aeWj



Pulmonary Vein

S1 Wsifimsl22.1cm/sD2 Eqnigwkt52.2cm/s

PVa aohvaqep497ucdw



 LEFT VENTRICLE 

The left ventricle is normal size. There is borderline concentric left ventricular hypertrophy. The l
eft ventricular systolic function is mildly impaired. The Ejection Fraction is 40-45%. The left ventr
icular diastolic function and filling is normal for age.



 RIGHT VENTRICLE 

The right ventricle is normal size. There is normal right ventricular wall thickness. Systolic functi
on is borderline reduced.



 ATRIA 

The left atrium is mildly dilated. The right atrium is mildly dilated. The interatrial septum is inta
ct with no evidence for an atrial septal defect or patent foramen ovale as noted on 2-D or Doppler im
aging.



 AORTIC VALVE 

The aortic valve is normal in structure and function. Doppler and Color Flow revealed no significant 
aortic regurgitation. There is no significant aortic valvular stenosis.



 MITRAL VALVE 

The mitral valve is thickened but opens well. There is no evidence of mitral valve prolapse. There is
 no mitral valve stenosis. Doppler and Color-flow revealed trace mitral regurgitation.



 TRICUSPID VALVE 

The tricuspid valve is normal in structure and function. Doppler and Color Flow revealed trace tricus
pid regurgitation. There is no tricuspid valve stenosis.



 PULMONIC VALVE 

The pulmonic valve is not well visualized. Doppler and Color Flow revealed no pulmonic valvular regur
gitation.



 GREAT VESSELS 

The aortic root is normal in size. The IVC was not visualized.



 PERICARDIAL EFFUSION 

There is no evidence of significant pericardial effusion.



Critical Notification

Critical Value: No



<Conclusion>

The left ventricular systolic function is mildly impaired.

The Ejection Fraction is 40-45%.

Trace mitral regurgitation.

Trace tricuspid regurgitation.

There is no evidence of significant pericardial effusion.



Signed by : Matt Ramirez, 

Electronically Approved : 04/16/2019 10:16:57

## 2019-06-17 ENCOUNTER — HOSPITAL ENCOUNTER (OUTPATIENT)
Dept: HOSPITAL 35 - CAT | Age: 64
End: 2019-06-17
Attending: INTERNAL MEDICINE
Payer: COMMERCIAL

## 2019-06-17 DIAGNOSIS — K80.20: ICD-10-CM

## 2019-06-17 DIAGNOSIS — I25.10: ICD-10-CM

## 2019-06-17 DIAGNOSIS — I48.91: Primary | ICD-10-CM

## 2019-06-17 LAB
ALBUMIN SERPL-MCNC: 4.1 G/DL (ref 3.4–5)
ALT SERPL-CCNC: 30 U/L (ref 30–65)
ANION GAP SERPL CALC-SCNC: 10 MMOL/L (ref 7–16)
AST SERPL-CCNC: 24 U/L (ref 15–37)
BASOPHILS NFR BLD AUTO: 0.7 % (ref 0–2)
BILIRUB SERPL-MCNC: 0.8 MG/DL
BUN SERPL-MCNC: 13 MG/DL (ref 7–18)
CALCIUM SERPL-MCNC: 9.1 MG/DL (ref 8.5–10.1)
CHLORIDE SERPL-SCNC: 106 MMOL/L (ref 98–107)
CO2 SERPL-SCNC: 28 MMOL/L (ref 21–32)
CREAT SERPL-MCNC: 0.9 MG/DL (ref 0.7–1.3)
EOSINOPHIL NFR BLD: 0.7 % (ref 0–3)
ERYTHROCYTE [DISTWIDTH] IN BLOOD BY AUTOMATED COUNT: 13.9 % (ref 10.5–14.5)
GLUCOSE SERPL-MCNC: 103 MG/DL (ref 74–106)
GRANULOCYTES NFR BLD MANUAL: 66.4 % (ref 36–66)
HCT VFR BLD CALC: 43.2 % (ref 42–52)
HGB BLD-MCNC: 14.6 GM/DL (ref 14–18)
LYMPHOCYTES NFR BLD AUTO: 21.6 % (ref 24–44)
MCH RBC QN AUTO: 30.5 PG (ref 26–34)
MCHC RBC AUTO-ENTMCNC: 33.7 G/DL (ref 28–37)
MCV RBC: 90.6 FL (ref 80–100)
MONOCYTES NFR BLD: 10.6 % (ref 1–8)
NEUTROPHILS # BLD: 4.6 THOU/UL (ref 1.4–8.2)
PLATELET # BLD: 190 THOU/UL (ref 150–400)
POTASSIUM SERPL-SCNC: 4 MMOL/L (ref 3.5–5.1)
PROT SERPL-MCNC: 7.2 G/DL (ref 6.4–8.2)
RBC # BLD AUTO: 4.77 MIL/UL (ref 4.5–6)
SODIUM SERPL-SCNC: 144 MMOL/L (ref 136–145)
WBC # BLD AUTO: 7 THOU/UL (ref 4–11)

## 2019-06-28 ENCOUNTER — HOSPITAL ENCOUNTER (OUTPATIENT)
Dept: HOSPITAL 61 - SURG | Age: 64
End: 2019-06-28
Attending: INTERNAL MEDICINE
Payer: COMMERCIAL

## 2019-06-28 VITALS — SYSTOLIC BLOOD PRESSURE: 122 MMHG | DIASTOLIC BLOOD PRESSURE: 93 MMHG

## 2019-06-28 DIAGNOSIS — I08.1: Primary | ICD-10-CM

## 2019-06-28 DIAGNOSIS — I48.91: ICD-10-CM

## 2019-06-28 DIAGNOSIS — I25.10: ICD-10-CM

## 2019-06-28 PROCEDURE — 93320 DOPPLER ECHO COMPLETE: CPT

## 2019-06-28 PROCEDURE — 93325 DOPPLER ECHO COLOR FLOW MAPG: CPT

## 2019-06-28 PROCEDURE — 93312 ECHO TRANSESOPHAGEAL: CPT

## 2019-06-28 NOTE — CARD
MR#: [id number]

Account#: [id number]

Accession#: [id number]

Date of Study: 06/28/2019

Ordering Physician: ARGENTINA MCCALLUM, 

Referring Physician: ARGENTINA MCCALLUM, 

Tech: Marbella Jarrett





--------------- APPROVED REPORT --------------





EXAM: Transesophageal echocardiogram with color flow Doppler.



INDICATION

Atrial Fibrillation

CAD 



Reason For Test : Rule out Intracardiac Thrombus.



PROCEDURE

After obtaining informed consent, patient underwent transesophageal echo in the PACU.

Type of Sedation : General Anesthesia

Sedation was administered by Linh Mckeon CRNA. 

Sedation was achieved with Propofol 250mg intravenously. 

Transesophageal probe was inserted and advanced into esophagus by Wayne Britt MD.

The JUSTUS was performed without complications. 

Throughout the procedure, the blood pressure, pulse oximetry, cardiac rhythm, and rate were monitored
.

The patient tolerated the procedure without adverse effects. Recovery from general anesthesia was une
ventful and vital signs were stable.



 LEFT VENTRICLE 

The left ventricle is normal size. There is normal left ventricular wall thickness. The systolic func
tion is mildly impaired. The Ejection Fraction is 45-50%. There is slight septal hypokinesis. 



 RIGHT VENTRICLE 

The right ventricle is normal size. There is normal right ventricular wall thickness. Systolic functi
on is borderline reduced.



 ATRIA 

The left atrium is mildly dilated. The right atrium is mildly dilated. The interatrial septum is inta
ct with no evidence for an atrial septal defect or patent foramen ovale as noted on 2-D or Doppler im
aging. There is no thrombus noted in the left atrial appendage.



 AORTIC VALVE 

The aortic valve is normal in structure and function. Doppler and Color Flow revealed no significant 
aortic regurgitation. There is no significant aortic valvular stenosis.



 MITRAL VALVE 

The mitral valve is normal in structure and function. There is no evidence of mitral valve prolapse. 
There is no mitral valve stenosis. Doppler and Color-flow revealed trace mitral regurgitation.



 TRICUSPID VALVE 

The tricuspid valve is normal in structure and function. Doppler and Color Flow revealed trace tricus
pid regurgitation. There is no tricuspid valve stenosis.



 PULMONIC VALVE 

The pulmonic valve is not well visualized. Doppler and Color Flow revealed no pulmonic valvular regur
gitation.



 GREAT VESSELS 

The aortic root is normal in size.



 PERICARDIAL EFFUSION 

There is no pleural effusion.



Critical Notification

Critical Value: No



<Conclusion>

The left ventricle is normal size.

The systolic function is mildly impaired.

The Ejection Fraction is 45-50%.

There is slight septal hypokinesis.

The left atrium is mildly dilated.

The right atrium is mildly dilated.

There is no thrombus noted in the left atrial appendage.

There is no significant aortic valvular stenosis.

Doppler and Color Flow revealed no significant aortic regurgitation.

Doppler and Color-flow revealed trace mitral regurgitation.

Doppler and Color Flow revealed trace tricuspid regurgitation.



Signed by : Wayne Britt MD

Electronically Approved : 06/28/2019 15:50:50

## 2019-07-11 ENCOUNTER — HOSPITAL ENCOUNTER (OUTPATIENT)
Dept: HOSPITAL 35 - CATH | Age: 64
Setting detail: OBSERVATION
LOS: 1 days | Discharge: HOME | End: 2019-07-12
Attending: INTERNAL MEDICINE | Admitting: INTERNAL MEDICINE
Payer: COMMERCIAL

## 2019-07-11 VITALS — SYSTOLIC BLOOD PRESSURE: 124 MMHG | DIASTOLIC BLOOD PRESSURE: 78 MMHG

## 2019-07-11 VITALS — DIASTOLIC BLOOD PRESSURE: 57 MMHG | SYSTOLIC BLOOD PRESSURE: 112 MMHG

## 2019-07-11 VITALS — SYSTOLIC BLOOD PRESSURE: 110 MMHG | DIASTOLIC BLOOD PRESSURE: 65 MMHG

## 2019-07-11 VITALS — BODY MASS INDEX: 33.32 KG/M2 | HEIGHT: 75.98 IN | WEIGHT: 273.6 LBS

## 2019-07-11 VITALS — DIASTOLIC BLOOD PRESSURE: 78 MMHG | SYSTOLIC BLOOD PRESSURE: 124 MMHG

## 2019-07-11 VITALS — DIASTOLIC BLOOD PRESSURE: 68 MMHG | SYSTOLIC BLOOD PRESSURE: 110 MMHG

## 2019-07-11 VITALS — SYSTOLIC BLOOD PRESSURE: 125 MMHG | DIASTOLIC BLOOD PRESSURE: 71 MMHG

## 2019-07-11 DIAGNOSIS — Z88.0: ICD-10-CM

## 2019-07-11 DIAGNOSIS — I48.0: Primary | ICD-10-CM

## 2019-07-11 DIAGNOSIS — I11.9: ICD-10-CM

## 2019-07-11 DIAGNOSIS — I25.10: ICD-10-CM

## 2019-07-11 DIAGNOSIS — Z79.899: ICD-10-CM

## 2019-07-11 LAB
ALBUMIN SERPL-MCNC: 4 G/DL (ref 3.4–5)
ALT SERPL-CCNC: 30 U/L (ref 30–65)
ANION GAP SERPL CALC-SCNC: 8 MMOL/L (ref 7–16)
ANISOCYTOSIS BLD QL SMEAR: SLIGHT
APTT BLD: 27.3 SECONDS (ref 24.5–32.8)
AST SERPL-CCNC: 22 U/L (ref 15–37)
BASOPHILS NFR BLD AUTO: 2 % (ref 0–2)
BILIRUB SERPL-MCNC: 0.8 MG/DL
BUN SERPL-MCNC: 16 MG/DL (ref 7–18)
CALCIUM SERPL-MCNC: 9.3 MG/DL (ref 8.5–10.1)
CHLORIDE SERPL-SCNC: 105 MMOL/L (ref 98–107)
CO2 SERPL-SCNC: 28 MMOL/L (ref 21–32)
CREAT SERPL-MCNC: 0.8 MG/DL (ref 0.7–1.3)
EOSINOPHIL NFR BLD: 3 % (ref 0–3)
ERYTHROCYTE [DISTWIDTH] IN BLOOD BY AUTOMATED COUNT: 13.9 % (ref 10.5–14.5)
GLUCOSE SERPL-MCNC: 126 MG/DL (ref 74–106)
GRANULOCYTES NFR BLD MANUAL: 54 % (ref 36–66)
HCT VFR BLD CALC: 43.5 % (ref 42–52)
HGB BLD-MCNC: 14.6 GM/DL (ref 14–18)
INR PPP: 1
LYMPHOCYTES NFR BLD AUTO: 33 % (ref 24–44)
MCH RBC QN AUTO: 30.4 PG (ref 26–34)
MCHC RBC AUTO-ENTMCNC: 33.6 G/DL (ref 28–37)
MCV RBC: 90.7 FL (ref 80–100)
MONOCYTES NFR BLD: 8 % (ref 1–8)
NEUTROPHILS # BLD: 2.7 THOU/UL (ref 1.4–8.2)
NEUTS BAND NFR BLD: 0 % (ref 0–8)
PLATELET # BLD: 180 THOU/UL (ref 150–400)
POTASSIUM SERPL-SCNC: 3.9 MMOL/L (ref 3.5–5.1)
PROT SERPL-MCNC: 7.6 G/DL (ref 6.4–8.2)
PROTHROMBIN TIME: 10.9 SECONDS (ref 9.3–11.4)
RBC # BLD AUTO: 4.79 MIL/UL (ref 4.5–6)
SODIUM SERPL-SCNC: 141 MMOL/L (ref 136–145)
WBC # BLD AUTO: 5 THOU/UL (ref 4–11)

## 2019-07-11 PROCEDURE — 70005: CPT

## 2019-07-11 PROCEDURE — 65040: CPT

## 2019-07-11 PROCEDURE — 62110: CPT

## 2019-07-11 PROCEDURE — 65020: CPT

## 2019-07-11 PROCEDURE — 62900: CPT

## 2019-07-11 NOTE — NUR
PT ARRIVED TO THE UNIT AT APPROX 1630 BY PACU STAFF. PT ORIENTED, DROWSY,
SPOUSE AT BEDSIDE. RIGHT GROIN SITE CDI, NO HEMATOMA, O2 SATS WNL ON 2L O2. PT
BEDREST 6 HOURS, CAMARGO IN PLACE URINATING ADEQUATELY. ADMISSION COMPLETED,
CONSENTS SIGNED. PT CONTINUES TO BE ORIENTED, ALERT AND CURRENTLY EATING
DINNER. APPETITE ADEQUATE, GROIN SITE REMAINS CDI, NO HEMATOMA. SR ON MONITOR.
CONTINUING TO MONITOR.

## 2019-07-12 VITALS — SYSTOLIC BLOOD PRESSURE: 119 MMHG | DIASTOLIC BLOOD PRESSURE: 72 MMHG

## 2019-07-12 VITALS — DIASTOLIC BLOOD PRESSURE: 72 MMHG | SYSTOLIC BLOOD PRESSURE: 119 MMHG

## 2019-07-12 VITALS — SYSTOLIC BLOOD PRESSURE: 97 MMHG | DIASTOLIC BLOOD PRESSURE: 56 MMHG

## 2019-07-12 NOTE — NUR
ASSUMED CARE AT 0700, SHIFT ASSESSMENT DONE, MEDS GIVEN, VSS. DENIES PAIN,
NAUSEA, VOMITING. CATH SITE IS SOFT TO TOUCH, NO BRUISING, EDEMA. PATIENT UP
AD [place], NO SIGN OF BLEEDING. DISCHARGE ORDER RECEIVED, PERIPHERAL IV WAS TAKEN
OUT. DISCHARGE MED AND PAPERWORK GIVEN. LEFT WITH [facility]ER TRANSPORT AT 1130

## 2019-07-12 NOTE — NUR
ASSESSMENT AS CHARTED. PATIENT HAD AFIB ABLATION DURING DAY ON BEDREST AT
START OF SHIFT DUE TO GET OFF BEDREST AT 2000. WHEN HE GOT OFF BED REST AND
STOOD NEXT TO BED, HE STARTED TO BLEED AGAIN. PRESSURE WAS HELD FOR 15 MINUTES
AND 6 HOUR BEDREST WAS RESTARTED. PATIENT HAS YET TO GET OUT OF BED. SITE WAS
REDRESSED AND IT HAS REMAINED CLEAN, DRY, INTACT AND SOFT. PATIENT SHOULD GO
HOME TODAY.

## 2019-08-09 ENCOUNTER — HOSPITAL ENCOUNTER (OUTPATIENT)
Dept: HOSPITAL 61 - SURG | Age: 64
Discharge: HOME | End: 2019-08-09
Attending: INTERNAL MEDICINE
Payer: COMMERCIAL

## 2019-08-09 VITALS
SYSTOLIC BLOOD PRESSURE: 96 MMHG | DIASTOLIC BLOOD PRESSURE: 46 MMHG | DIASTOLIC BLOOD PRESSURE: 46 MMHG | SYSTOLIC BLOOD PRESSURE: 96 MMHG

## 2019-08-09 DIAGNOSIS — I48.91: Primary | ICD-10-CM

## 2019-08-09 LAB
ANION GAP SERPL CALC-SCNC: 6 MMOL/L (ref 6–14)
BASOPHILS # BLD AUTO: 0 X10^3/UL (ref 0–0.2)
BASOPHILS NFR BLD: 0 % (ref 0–3)
BUN SERPL-MCNC: 11 MG/DL (ref 8–26)
CALCIUM SERPL-MCNC: 8.7 MG/DL (ref 8.5–10.1)
CHLORIDE SERPL-SCNC: 105 MMOL/L (ref 98–107)
CO2 SERPL-SCNC: 29 MMOL/L (ref 21–32)
CREAT SERPL-MCNC: 0.8 MG/DL (ref 0.7–1.3)
EOSINOPHIL NFR BLD: 0.1 X10^3/UL (ref 0–0.7)
EOSINOPHIL NFR BLD: 2 % (ref 0–3)
ERYTHROCYTE [DISTWIDTH] IN BLOOD BY AUTOMATED COUNT: 14.1 % (ref 11.5–14.5)
GFR SERPLBLD BASED ON 1.73 SQ M-ARVRAT: 98 ML/MIN
GLUCOSE SERPL-MCNC: 113 MG/DL (ref 70–99)
HCT VFR BLD CALC: 39.5 % (ref 39–53)
HGB BLD-MCNC: 13.3 G/DL (ref 13–17.5)
LYMPHOCYTES # BLD: 1.7 X10^3/UL (ref 1–4.8)
LYMPHOCYTES NFR BLD AUTO: 30 % (ref 24–48)
MCH RBC QN AUTO: 31 PG (ref 25–35)
MCHC RBC AUTO-ENTMCNC: 34 G/DL (ref 31–37)
MCV RBC AUTO: 91 FL (ref 79–100)
MONO #: 0.6 X10^3/UL (ref 0–1.1)
MONOCYTES NFR BLD: 11 % (ref 0–9)
NEUT #: 3.1 X10^3/UL (ref 1.8–7.7)
NEUTROPHILS NFR BLD AUTO: 56 % (ref 31–73)
PLATELET # BLD AUTO: 179 X10^3/UL (ref 140–400)
POTASSIUM SERPL-SCNC: 3.5 MMOL/L (ref 3.5–5.1)
RBC # BLD AUTO: 4.33 X10^6/UL (ref 4.3–5.7)
SODIUM SERPL-SCNC: 140 MMOL/L (ref 136–145)
WBC # BLD AUTO: 5.5 X10^3/UL (ref 4–11)

## 2019-08-09 PROCEDURE — 93005 ELECTROCARDIOGRAM TRACING: CPT

## 2019-08-09 PROCEDURE — 80048 BASIC METABOLIC PNL TOTAL CA: CPT

## 2019-08-09 PROCEDURE — 92960 CARDIOVERSION ELECTRIC EXT: CPT

## 2019-08-09 PROCEDURE — 36415 COLL VENOUS BLD VENIPUNCTURE: CPT

## 2019-08-09 PROCEDURE — 85025 COMPLETE CBC W/AUTO DIFF WBC: CPT

## 2019-08-09 NOTE — EKG
[facility]

               [address] [place] [address]

Test Date:    2019               Test Time:    12:01:37

Pat Name:     CADEN CUNNINGHAM        Department:   

Patient ID:   [id number]           Room:          

Gender:       M                        Technician:   ANAMARIA

:          1955               Requested By: DOTTIE RAMOS

Order Number: [id number]           Reading MD:     

                                 Measurements

Intervals                              Axis          

Rate:         66                       P:            56

OH:           206                      QRS:          1

QRSD:         90                       T:            52

QT:           424                                    

QTc:          446                                    

                           Interpretive Statements

SINUS RHYTHM

QRS(T) CONTOUR ABNORMALITY

CONSIDER ANTEROLATERAL MYOCARDIAL DAMAGE

POSSIBLY ABNORMAL ECG

RI6.01          Unconfirmed report

Compared to ECG 2018 12:13:02

No significant changes

## 2019-08-12 NOTE — PDOC4
PROCEDURE


Procedure


Procedure. Cardioversion.





The patient is a 64-year-old male with a history of atrial fibrillation. He 

underwent a recent ablation but was found to have resumed atrial fibrillation. 

He was reviewed by the EP service and recommended for repeat cardioversion. 

Patient had been on constant anticoagulation. Risks and benefits of procedure 

were discussed with the patient. He agreed to proceed.





After informed consent was obtained the patient was sedated as per the 

anesthesiology service. Lab testing was within normal limits. He received 200 J 

of synchronized energy which converted him from atrial fibrillation to a sinus 

rhythm. He awoke normally from his sedation.





Successful cardioversion of atrial fibrillation to a normal sinus rhythm.











DOTTIE RAMOS MD            Aug 12, 2019 12:40

## 2021-02-10 ENCOUNTER — HOSPITAL ENCOUNTER (OUTPATIENT)
Dept: HOSPITAL 35 - SJCVCIMAG | Age: 66
End: 2021-02-10
Attending: INTERNAL MEDICINE
Payer: COMMERCIAL

## 2021-02-10 DIAGNOSIS — I25.5: ICD-10-CM

## 2021-02-10 DIAGNOSIS — I10: ICD-10-CM

## 2021-02-10 DIAGNOSIS — E78.5: ICD-10-CM

## 2021-02-10 DIAGNOSIS — I48.91: Primary | ICD-10-CM

## 2021-03-08 ENCOUNTER — HOSPITAL ENCOUNTER (OUTPATIENT)
Dept: HOSPITAL 63 - PMG | Age: 66
End: 2021-03-08
Attending: PHYSICIAN ASSISTANT
Payer: COMMERCIAL

## 2021-03-08 DIAGNOSIS — M17.11: ICD-10-CM

## 2021-03-08 DIAGNOSIS — M16.11: Primary | ICD-10-CM

## 2021-03-08 PROCEDURE — 73562 X-RAY EXAM OF KNEE 3: CPT

## 2021-03-08 PROCEDURE — 73502 X-RAY EXAM HIP UNI 2-3 VIEWS: CPT

## 2021-03-08 NOTE — RAD
EXAM: Pelvis and right hip, 3 views; right knee, 3 views.



HISTORY: Pain.



COMPARISON: None.



FINDINGS: 



Pelvis and right hip: A frontal view the pelvis and 2 views of the right hip are obtained. There is r
ight hip joint space narrowing with degenerative subchondral sclerosis, subchondral cyst formation an
d marginal femoral head and acetabular spurring. There is degenerative change involving the visualize
d lumbar spine and suspected lumbar scoliosis, not formally assessed on this exam.



Right knee: 3 views of the right knee are obtained. There is medial compartment joint space narrowing
 and medial compartment predominant truck vertebral spurring. There is trace joint fluid without a si
gnificant effusion. There is no fracture, dislocation or subluxation.



IMPRESSION: 

1. Moderate to severe osteoarthritis of the right hip.

2. Mild medial compartment predominant tricompartmental osteoarthritis of the right knee.



Electronically signed by: Angelique Martinez MD (3/8/2021 11:28 AM) [id number]

## 2021-03-08 NOTE — RAD
EXAM: Pelvis and right hip, 3 views; right knee, 3 views.



HISTORY: Pain.



COMPARISON: None.



FINDINGS: 



Pelvis and right hip: A frontal view the pelvis and 2 views of the right hip are obtained. There is r
ight hip joint space narrowing with degenerative subchondral sclerosis, subchondral cyst formation an
d marginal femoral head and acetabular spurring. There is degenerative change involving the visualize
d lumbar spine and suspected lumbar scoliosis, not formally assessed on this exam.



Right knee: 3 views of the right knee are obtained. There is medial compartment joint space narrowing
 and medial compartment predominant truck vertebral spurring. There is trace joint fluid without a si
gnificant effusion. There is no fracture, dislocation or subluxation.



IMPRESSION: 

1. Moderate to severe osteoarthritis of the right hip.

2. Mild medial compartment predominant tricompartmental osteoarthritis of the right knee.



Electronically signed by: Angelique Martinez MD (3/8/2021 11:28 AM) [id number]

## 2021-04-01 NOTE — EKG
[facility]

               [address] [place] [address]

Test Date:    2019               Test Time:    10:42:50

Pat Name:     CADEN CUNNINGHAM        Department:   

Patient ID:   [id number]           Room:          

Gender:       M                        Technician:   JESSICA

:          1955               Requested By: DOTTIE RAMOS

Order Number: [id number]           Reading MD:     

                                 Measurements

Intervals                              Axis          

Rate:         91                       P:            0

NY:           228                      QRS:          0

QRSD:         82                       T:            54

QT:           354                                    

QTc:          437                                    

                           Interpretive Statements

SINUS RHYTHM

ATRIAL PREMATURE COMPLEX(ES)

PROLONGED NY INTERVAL

LEFTWARD AXIS

QRS(T) CONTOUR ABNORMALITY

CONSISTENT WITH ANTEROSEPTAL INFARCT

AGE UNDETERMINED

ABNORMAL ECG

RI6.01

Compared to ECG 2018 12:13:02

First degree AV block now present

Left-axis deviation now present

Myocardial infarct finding now present nasal cannula

## 2021-06-07 ENCOUNTER — HOSPITAL ENCOUNTER (OUTPATIENT)
Dept: HOSPITAL 61 - SURGPAT | Age: 66
End: 2021-06-07
Attending: ORTHOPAEDIC SURGERY
Payer: COMMERCIAL

## 2021-06-07 DIAGNOSIS — Z01.818: ICD-10-CM

## 2021-06-07 DIAGNOSIS — M16.11: Primary | ICD-10-CM

## 2021-06-07 LAB
ALBUMIN SERPL-MCNC: 4.3 G/DL (ref 3.4–5)
ANION GAP SERPL CALC-SCNC: 10 MMOL/L (ref 6–14)
APTT BLD: 31 SEC (ref 24–38)
BASOPHILS # BLD AUTO: 0 X10^3/UL (ref 0–0.2)
BASOPHILS NFR BLD: 0 % (ref 0–3)
BUN SERPL-MCNC: 12 MG/DL (ref 8–26)
CALCIUM SERPL-MCNC: 9.3 MG/DL (ref 8.5–10.1)
CHLORIDE SERPL-SCNC: 105 MMOL/L (ref 98–107)
CO2 SERPL-SCNC: 29 MMOL/L (ref 21–32)
CREAT SERPL-MCNC: 0.8 MG/DL (ref 0.7–1.3)
EOSINOPHIL NFR BLD: 0.1 X10^3/UL (ref 0–0.7)
EOSINOPHIL NFR BLD: 2 % (ref 0–3)
ERYTHROCYTE [DISTWIDTH] IN BLOOD BY AUTOMATED COUNT: 13.7 % (ref 11.5–14.5)
GFR SERPLBLD BASED ON 1.73 SQ M-ARVRAT: 97.3 ML/MIN
GLUCOSE SERPL-MCNC: 115 MG/DL (ref 70–99)
HBA1C MFR BLD: 6.4 % (ref 4.8–5.6)
HCT VFR BLD CALC: 41.6 % (ref 39–53)
HGB BLD-MCNC: 14.2 G/DL (ref 13–17.5)
LYMPHOCYTES # BLD: 1.3 X10^3/UL (ref 1–4.8)
LYMPHOCYTES NFR BLD AUTO: 21 % (ref 24–48)
MCH RBC QN AUTO: 31 PG (ref 25–35)
MCHC RBC AUTO-ENTMCNC: 34 G/DL (ref 31–37)
MCV RBC AUTO: 90 FL (ref 79–100)
MONO #: 0.7 X10^3/UL (ref 0–1.1)
MONOCYTES NFR BLD: 12 % (ref 0–9)
NEUT #: 4 X10^3/UL (ref 1.8–7.7)
NEUTROPHILS NFR BLD AUTO: 65 % (ref 31–73)
PLATELET # BLD AUTO: 211 X10^3/UL (ref 140–400)
POTASSIUM SERPL-SCNC: 3.6 MMOL/L (ref 3.5–5.1)
PROTHROMBIN TIME: 13.8 SEC (ref 11.7–14)
RBC # BLD AUTO: 4.63 X10^6/UL (ref 4.3–5.7)
SODIUM SERPL-SCNC: 144 MMOL/L (ref 136–145)
WBC # BLD AUTO: 6.1 X10^3/UL (ref 4–11)

## 2021-06-07 PROCEDURE — 36415 COLL VENOUS BLD VENIPUNCTURE: CPT

## 2021-06-07 PROCEDURE — 85610 PROTHROMBIN TIME: CPT

## 2021-06-07 PROCEDURE — 71046 X-RAY EXAM CHEST 2 VIEWS: CPT

## 2021-06-07 PROCEDURE — 82040 ASSAY OF SERUM ALBUMIN: CPT

## 2021-06-07 PROCEDURE — 85025 COMPLETE CBC W/AUTO DIFF WBC: CPT

## 2021-06-07 PROCEDURE — 80048 BASIC METABOLIC PNL TOTAL CA: CPT

## 2021-06-07 PROCEDURE — 82306 VITAMIN D 25 HYDROXY: CPT

## 2021-06-07 PROCEDURE — 85730 THROMBOPLASTIN TIME PARTIAL: CPT

## 2021-06-07 PROCEDURE — 87641 MR-STAPH DNA AMP PROBE: CPT

## 2021-06-07 PROCEDURE — 85651 RBC SED RATE NONAUTOMATED: CPT

## 2021-06-07 PROCEDURE — 83036 HEMOGLOBIN GLYCOSYLATED A1C: CPT

## 2021-06-07 NOTE — RAD
EXAM: Chest, 2 views.



HISTORY: Preoperative evaluation. Atrial fibrillation.



COMPARISON: 3/15/2018.



FINDINGS: 2 views of the chest are obtained. There is no infiltrate, pleural effusion or pneumothorax
. The heart is normal in size.



IMPRESSION: No acute pulmonary finding.



Electronically signed by: Angelique Martinez MD (6/7/2021 2:09 PM) [id number]

## 2021-07-06 ENCOUNTER — HOSPITAL ENCOUNTER (EMERGENCY)
Dept: HOSPITAL 63 - ER | Age: 66
Discharge: HOME | End: 2021-07-06
Payer: COMMERCIAL

## 2021-07-06 VITALS
DIASTOLIC BLOOD PRESSURE: 98 MMHG | SYSTOLIC BLOOD PRESSURE: 138 MMHG | SYSTOLIC BLOOD PRESSURE: 138 MMHG | DIASTOLIC BLOOD PRESSURE: 98 MMHG

## 2021-07-06 VITALS — WEIGHT: 285.5 LBS | HEIGHT: 76 IN | BODY MASS INDEX: 34.77 KG/M2

## 2021-07-06 DIAGNOSIS — Z48.01: Primary | ICD-10-CM

## 2021-07-06 DIAGNOSIS — Z88.0: ICD-10-CM

## 2021-07-06 PROCEDURE — 99281 EMR DPT VST MAYX REQ PHY/QHP: CPT

## 2021-07-06 NOTE — PHYS DOC
Past History


Past Medical History:  No Pertinent History


Past Surgical History:  Angioplasty, Hip Replacement


Alcohol Use:  None


Drug Use:  None





General Adult


EDM:


Chief Complaint:  WOUND CHECK





HPI:


HPI:


66-year-old male presents for wound check.  The patient had hip surgery 2 weeks 

ago today.  He is scheduled to go back to his surgeon tomorrow to have his 

dressing removed or changed.  He presents today because when he was getting 

ready to go to his physical therapy he noticed that the wound covering was 

completely filled with blood and this was not the case yesterday.  He was 

concerned this could indicate a more significant problem.  He attempted to call 

the orthopedic office but did not get a call back.  He decided to come to the 

emergency room.  He denies any other symptoms such as increased pain, dizziness,

lightheadedness, nausea.





Review of Systems:


Review of Systems:


Constitutional:  Denies fever or chills 


Eyes:  Denies change in visual acuity 


HENT:  Denies nasal congestion or sore throat 


Respiratory:  Denies cough or shortness of breath 


Cardiovascular:  Denies chest pain or edema 


GI:  Denies abdominal pain, nausea, vomiting, bloody stools or diarrhea 


: Denies dysuria 


Musculoskeletal:  Denies back pain or joint pain 


Integument: Soaked right hip wound dressing


Neurologic:  Denies headache, focal weakness or sensory changes 


Endocrine:  Denies polyuria or polydipsia 


Lymphatic:  Denies swollen glands 


Psychiatric:  Denies depression or anxiety





Allergies:


Allergies:





Allergies








Coded Allergies Type Severity Reaction Last Updated Verified


 


  Penicillins Allergy Unknown Unknown 3/12/18 Yes











Physical Exam:


PE:





Constitutional: Well developed, well nourished, no acute distress, non-toxic 

appearance. []


HENT: Normocephalic, atraumatic, bilateral external ears normal, oropharynx 

moist, no oral exudates, nose normal. []


Eyes: PERRLA, EOMI, conjunctiva normal, no discharge. [] 


Neck: Normal range of motion, no tenderness, supple, no stridor. [] 


Cardiovascular:Heart rate regular rhythm, no murmur []


Lungs & Thorax:  Bilateral breath sounds clear to auscultation []


Abdomen: Bowel sounds normal, soft, no tenderness, no masses, no pulsatile 

masses. [] 


Skin: Saturated right hip incision dressing with serosanguineous fluid [] 


Back: No tenderness, no CVA tenderness. [] 


Extremities: No tenderness, no cyanosis, no clubbing, ROM intact, no edema. [] 


Neurologic: Alert and oriented X 3, normal motor function, normal sensory 

function, no focal deficits noted. []


Psychologic: Affect normal, judgement normal, mood normal. []





Current Patient Data:


Vital Signs:





                                   Vital Signs








  Date Time  Temp Pulse Resp B/P (MAP) Pulse Ox O2 Delivery O2 Flow Rate FiO2


 


7/6/21 13:20 98.2 89 16 138/98 96 Room Air  











EKG:


EKG:


[]





Radiology/Procedures:


Radiology/Procedures:


[]





Heart Score:


C/O Chest Pain:  N/A


Risk Factors:


Risk Factors:  DM, Current or recent (<one month) smoker, HTN, HLP, family 

history of CAD, obesity.


Risk Scores:


Score 0 - 3:  2.5% MACE over next 6 weeks - Discharge Home


Score 4 - 6:  20.3% MACE over next 6 weeks - Admit for Clinical Observation


Score 7 - 10:  72.7% MACE over next 6 weeks - Early Invasive Strategies





Course & Med Decision Making:


Course & Med Decision Making


Pertinent Labs and Imaging studies reviewed. (See chart for details)


The patient's wound dressing appears to be serosanguineous fluid.  It is dark in

 color, but not solely blood.  This could have been from leaking seroma.  He has

 no other symptoms of concern.  I believe it is reasonable for him to wait until

 tomorrow to remove the dressing at the surgeon's office.  I spoke with the 

orthopedic surgeon on-call, Dr. Shane and he recommended we add additional 

dressing to help absorb any fluid that might leak.  He does not believe he needs

 to be removed until the patient is seen in the office tomorrow.  He is stable 

for discharge at this time.


[]





Dragon Disclaimer:


Dragon Disclaimer:


This electronic medical record was generated, in whole or in part, using a voice

 recognition dictation system.





Departure


Departure:


Impression:  


   Primary Impression:  


   Encounter for postoperative wound check


Disposition:  01 HOME / SELF CARE / HOMELESS


Condition:  STABLE


Referrals:  


SHARONDA FRANK (PCP)


Patient Instructions:  Wound Check











ARACELIS RICHARD DO                  Jul 6, 2021 14:28

## 2021-11-08 ENCOUNTER — HOSPITAL ENCOUNTER (OUTPATIENT)
Dept: HOSPITAL 63 - RAD | Age: 66
End: 2021-11-08
Attending: PHYSICIAN ASSISTANT
Payer: COMMERCIAL

## 2021-11-08 DIAGNOSIS — M17.0: Primary | ICD-10-CM

## 2021-11-08 DIAGNOSIS — M25.761: ICD-10-CM

## 2021-11-08 DIAGNOSIS — M25.762: ICD-10-CM

## 2021-11-08 PROCEDURE — 73565 X-RAY EXAM OF KNEES: CPT

## 2021-11-08 PROCEDURE — 73560 X-RAY EXAM OF KNEE 1 OR 2: CPT

## 2021-11-08 NOTE — RAD
EXAM: AP view both knees, lateral and tangential patellar view left knee



DATE: 11/8/2021 2:25 PM



INDICATION: Reason: KNEE PAIN / Spl. Instructions:  / History:  



COMPARISON: No Prior



FINDINGS:

Left knee: Small tricompartmental osteophytes. No knee joint effusion. Neutral patellar tracking. No 
evidence of acute fracture or dislocation.



Right knee single AP view right knee demonstrates trace medial compartment joint space narrowing and 
small associated osteophytes.



IMPRESSION:

No evidence of acute fracture or dislocation.

Mild bilateral knee joint osteoarthritis



Electronically signed by: Ji Kerr MD (11/8/2021 4:07 PM) [id number]

## 2022-02-28 ENCOUNTER — HOSPITAL ENCOUNTER (OUTPATIENT)
Dept: HOSPITAL 35 - CATH | Age: 67
Discharge: HOME | End: 2022-02-28
Attending: INTERNAL MEDICINE
Payer: COMMERCIAL

## 2022-02-28 VITALS — BODY MASS INDEX: 34.7 KG/M2 | WEIGHT: 285 LBS | HEIGHT: 76 IN

## 2022-02-28 VITALS — SYSTOLIC BLOOD PRESSURE: 136 MMHG | DIASTOLIC BLOOD PRESSURE: 74 MMHG

## 2022-02-28 DIAGNOSIS — I48.3: Primary | ICD-10-CM

## 2022-02-28 DIAGNOSIS — I11.0: ICD-10-CM

## 2022-02-28 DIAGNOSIS — Z20.822: ICD-10-CM

## 2022-02-28 DIAGNOSIS — Z88.0: ICD-10-CM

## 2022-02-28 DIAGNOSIS — E78.5: ICD-10-CM

## 2022-02-28 DIAGNOSIS — I50.9: ICD-10-CM

## 2022-02-28 DIAGNOSIS — Z87.891: ICD-10-CM

## 2022-02-28 DIAGNOSIS — I25.10: ICD-10-CM

## 2022-02-28 DIAGNOSIS — I48.91: ICD-10-CM

## 2022-02-28 DIAGNOSIS — Z98.890: ICD-10-CM

## 2022-02-28 DIAGNOSIS — Z79.899: ICD-10-CM

## 2022-02-28 DIAGNOSIS — I42.9: ICD-10-CM

## 2022-02-28 DIAGNOSIS — Z79.01: ICD-10-CM

## 2022-02-28 DIAGNOSIS — E66.9: ICD-10-CM

## 2022-02-28 LAB
ALBUMIN SERPL-MCNC: 3.9 G/DL (ref 3.4–5)
ALT SERPL-CCNC: 22 U/L (ref 16–63)
ANION GAP SERPL CALC-SCNC: 8 MMOL/L (ref 7–16)
APTT BLD: 27.3 SECONDS (ref 24.5–32.8)
AST SERPL-CCNC: 17 U/L (ref 15–37)
BASOPHILS NFR BLD AUTO: 1.1 % (ref 0–2)
BILIRUB SERPL-MCNC: 0.6 MG/DL (ref 0.2–1)
BUN SERPL-MCNC: 14 MG/DL (ref 7–18)
CALCIUM SERPL-MCNC: 9.3 MG/DL (ref 8.5–10.1)
CHLORIDE SERPL-SCNC: 103 MMOL/L (ref 98–107)
CO2 SERPL-SCNC: 26 MMOL/L (ref 21–32)
CREAT SERPL-MCNC: 0.7 MG/DL (ref 0.7–1.3)
EOSINOPHIL NFR BLD: 1.3 % (ref 0–3)
ERYTHROCYTE [DISTWIDTH] IN BLOOD BY AUTOMATED COUNT: 14.3 % (ref 10.5–14.5)
GLUCOSE SERPL-MCNC: 115 MG/DL (ref 74–106)
GRANULOCYTES NFR BLD MANUAL: 65.4 % (ref 36–66)
HCT VFR BLD CALC: 46 % (ref 42–52)
HGB BLD-MCNC: 15.3 GM/DL (ref 14–18)
INR PPP: 1.05
LYMPHOCYTES NFR BLD AUTO: 20.7 % (ref 24–44)
MCH RBC QN AUTO: 29.2 PG (ref 26–34)
MCHC RBC AUTO-ENTMCNC: 33.3 G/DL (ref 28–37)
MCV RBC: 87.9 FL (ref 80–100)
MONOCYTES NFR BLD: 11.5 % (ref 1–8)
NEUTROPHILS # BLD: 4.3 THOU/UL (ref 1.4–8.2)
PLATELET # BLD: 245 THOU/UL (ref 150–400)
POTASSIUM SERPL-SCNC: 4.2 MMOL/L (ref 3.5–5.1)
PROT SERPL-MCNC: 7.3 G/DL (ref 6.4–8.2)
PROTHROMBIN TIME: 11.4 SECONDS (ref 10.5–12.1)
RBC # BLD AUTO: 5.23 MIL/UL (ref 4.5–6)
SODIUM SERPL-SCNC: 137 MMOL/L (ref 136–145)
WBC # BLD AUTO: 6.6 THOU/UL (ref 4–11)

## 2022-02-28 PROCEDURE — 62110: CPT

## 2022-02-28 PROCEDURE — 62900: CPT

## 2022-02-28 PROCEDURE — 70005: CPT
